# Patient Record
Sex: MALE | Race: WHITE | NOT HISPANIC OR LATINO | Employment: FULL TIME | URBAN - METROPOLITAN AREA
[De-identification: names, ages, dates, MRNs, and addresses within clinical notes are randomized per-mention and may not be internally consistent; named-entity substitution may affect disease eponyms.]

---

## 2017-06-08 DIAGNOSIS — I71.21 ASCENDING AORTIC ANEURYSM (HCC): Primary | ICD-10-CM

## 2017-06-08 DIAGNOSIS — Z95.2 H/O PROSTHETIC AORTIC VALVE REPLACEMENT: ICD-10-CM

## 2017-06-26 ENCOUNTER — HOSPITAL ENCOUNTER (OUTPATIENT)
Dept: CARDIOLOGY | Facility: HOSPITAL | Age: 42
Discharge: HOME | End: 2017-06-26
Attending: THORACIC SURGERY (CARDIOTHORACIC VASCULAR SURGERY)

## 2017-06-26 ENCOUNTER — HOSPITAL ENCOUNTER (OUTPATIENT)
Dept: CT IMAGING | Facility: HOSPITAL | Age: 42
Discharge: HOME | End: 2017-06-26
Attending: THORACIC SURGERY (CARDIOTHORACIC VASCULAR SURGERY) | Admitting: THORACIC SURGERY (CARDIOTHORACIC VASCULAR SURGERY)

## 2017-06-26 VITALS
SYSTOLIC BLOOD PRESSURE: 134 MMHG | HEIGHT: 75 IN | HEART RATE: 55 BPM | BODY MASS INDEX: 31.58 KG/M2 | DIASTOLIC BLOOD PRESSURE: 92 MMHG | OXYGEN SATURATION: 100 % | WEIGHT: 254 LBS

## 2017-06-26 DIAGNOSIS — Z95.2 H/O PROSTHETIC AORTIC VALVE REPLACEMENT: ICD-10-CM

## 2017-06-26 DIAGNOSIS — I71.21 ASCENDING AORTIC ANEURYSM (HCC): ICD-10-CM

## 2017-06-26 LAB
ASCENDING AORTA: 3.1 CM
BH CV ECHO MEAS - ACS: 2 CM
BH CV ECHO MEAS - AO MAX PG: 10 MMHG
BH CV ECHO MEAS - AO MEAN PG (FULL): 5 MMHG
BH CV ECHO MEAS - AO MEAN PG: 6 MMHG
BH CV ECHO MEAS - AO ROOT AREA (BSA CORRECTED): 1.6
BH CV ECHO MEAS - AO ROOT AREA: 11.3 CM^2
BH CV ECHO MEAS - AO ROOT DIAM: 3.8 CM
BH CV ECHO MEAS - AO V2 MAX: 159 CM/SEC
BH CV ECHO MEAS - AO V2 MEAN: 109 CM/SEC
BH CV ECHO MEAS - AO V2 VTI: 35.7 CM
BH CV ECHO MEAS - ASC AORTA: 3.1 CM
BH CV ECHO MEAS - AVA(I,A): 1.2 CM^2
BH CV ECHO MEAS - AVA(I,D): 1.2 CM^2
BH CV ECHO MEAS - BSA(HAYCOCK): 2.5 M^2
BH CV ECHO MEAS - BSA: 2.4 M^2
BH CV ECHO MEAS - BZI_BMI: 31.7 KILOGRAMS/M^2
BH CV ECHO MEAS - BZI_METRIC_HEIGHT: 190.5 CM
BH CV ECHO MEAS - BZI_METRIC_WEIGHT: 115.2 KG
BH CV ECHO MEAS - CONTRAST EF (2CH): 52.3 ML/M^2
BH CV ECHO MEAS - CONTRAST EF 4CH: 49.7 ML/M^2
BH CV ECHO MEAS - EDV(CUBED): 117.6 ML
BH CV ECHO MEAS - EDV(MOD-SP2): 176 ML
BH CV ECHO MEAS - EDV(MOD-SP4): 187 ML
BH CV ECHO MEAS - EDV(TEICH): 112.8 ML
BH CV ECHO MEAS - EF(CUBED): 79.3 %
BH CV ECHO MEAS - EF(MOD-SP2): 52.3 %
BH CV ECHO MEAS - EF(MOD-SP4): 49.7 %
BH CV ECHO MEAS - EF(TEICH): 71.4 %
BH CV ECHO MEAS - ESV(CUBED): 24.4 ML
BH CV ECHO MEAS - ESV(MOD-SP2): 84 ML
BH CV ECHO MEAS - ESV(MOD-SP4): 94 ML
BH CV ECHO MEAS - ESV(TEICH): 32.2 ML
BH CV ECHO MEAS - FS: 40.8 %
BH CV ECHO MEAS - IVS/LVPW: 0.82
BH CV ECHO MEAS - IVSD: 0.9 CM
BH CV ECHO MEAS - LA DIMENSION(2D): 21 CM
BH CV ECHO MEAS - LA DIMENSION: 6 CM
BH CV ECHO MEAS - LAT PEAK E' VEL: 8 CM/SEC
BH CV ECHO MEAS - LV DIASTOLIC VOL/BSA (35-75): 77 ML/M^2
BH CV ECHO MEAS - LV MASS(C)D: 176 GRAMS
BH CV ECHO MEAS - LV MASS(C)DI: 72.5 GRAMS/M^2
BH CV ECHO MEAS - LV MEAN PG: 1 MMHG
BH CV ECHO MEAS - LV SYSTOLIC VOL/BSA (12-30): 38.7 ML/M^2
BH CV ECHO MEAS - LV V1 MEAN: 34.5 CM/SEC
BH CV ECHO MEAS - LV V1 VTI: 13.8 CM
BH CV ECHO MEAS - LVIDD: 4.9 CM
BH CV ECHO MEAS - LVIDS: 2.9 CM
BH CV ECHO MEAS - LVLD AP2: 9.6 CM
BH CV ECHO MEAS - LVLD AP4: 9.9 CM
BH CV ECHO MEAS - LVLS AP2: 8.5 CM
BH CV ECHO MEAS - LVLS AP4: 8.8 CM
BH CV ECHO MEAS - LVOT AREA (M): 3.1 CM^2
BH CV ECHO MEAS - LVOT AREA: 3.1 CM^2
BH CV ECHO MEAS - LVOT DIAM: 2 CM
BH CV ECHO MEAS - LVPWD: 1.1 CM
BH CV ECHO MEAS - MED PEAK E' VEL: 8 CM/SEC
BH CV ECHO MEAS - MR MAX PG: 31.8 MMHG
BH CV ECHO MEAS - MR MAX VEL: 282 CM/SEC
BH CV ECHO MEAS - MV A DUR: 0.12 SEC
BH CV ECHO MEAS - MV A MAX VEL: 45.5 CM/SEC
BH CV ECHO MEAS - MV DEC SLOPE: 329 CM/SEC^2
BH CV ECHO MEAS - MV DEC TIME: 0.14 SEC
BH CV ECHO MEAS - MV E MAX VEL: 60.3 CM/SEC
BH CV ECHO MEAS - MV E/A: 1.3
BH CV ECHO MEAS - MV MEAN PG: 1 MMHG
BH CV ECHO MEAS - MV P1/2T MAX VEL: 63.6 CM/SEC
BH CV ECHO MEAS - MV P1/2T: 56.6 MSEC
BH CV ECHO MEAS - MV V2 MEAN: 37.3 CM/SEC
BH CV ECHO MEAS - MV V2 VTI: 25.6 CM
BH CV ECHO MEAS - MVA P1/2T LCG: 3.5 CM^2
BH CV ECHO MEAS - MVA(P1/2T): 3.9 CM^2
BH CV ECHO MEAS - MVA(VTI): 1.7 CM^2
BH CV ECHO MEAS - PA ACC SLOPE: 1487 CM/SEC^2
BH CV ECHO MEAS - PA ACC TIME: 0.07 SEC
BH CV ECHO MEAS - PA MAX PG (FULL): 2.3 MMHG
BH CV ECHO MEAS - PA MAX PG: 4.3 MMHG
BH CV ECHO MEAS - PA PR(ACCEL): 47.5 MMHG
BH CV ECHO MEAS - PA V2 MAX: 104 CM/SEC
BH CV ECHO MEAS - PI END-D VEL: 91.8 CM/SEC
BH CV ECHO MEAS - PULM A REVS DUR: 0.1 SEC
BH CV ECHO MEAS - PULM A REVS VEL: 30.3 CM/SEC
BH CV ECHO MEAS - PULM DIAS VEL: 50.2 CM/SEC
BH CV ECHO MEAS - PULM S/D: 1
BH CV ECHO MEAS - PULM SYS VEL: 50.7 CM/SEC
BH CV ECHO MEAS - PVA(V,A): 2.1 CM^2
BH CV ECHO MEAS - PVA(V,D): 2.1 CM^2
BH CV ECHO MEAS - QP/QS: 1.2
BH CV ECHO MEAS - RAP SYSTOLE: 3 MMHG
BH CV ECHO MEAS - RV MAX PG: 2 MMHG
BH CV ECHO MEAS - RV MEAN PG: 1 MMHG
BH CV ECHO MEAS - RV V1 MAX: 71 CM/SEC
BH CV ECHO MEAS - RV V1 MEAN: 44.2 CM/SEC
BH CV ECHO MEAS - RV V1 VTI: 16.2 CM
BH CV ECHO MEAS - RVOT AREA: 3.1 CM^2
BH CV ECHO MEAS - RVOT DIAM: 2 CM
BH CV ECHO MEAS - RVSP: 18.7 MMHG
BH CV ECHO MEAS - SI(AO): 166.7 ML/M^2
BH CV ECHO MEAS - SI(CUBED): 38.4 ML/M^2
BH CV ECHO MEAS - SI(LVOT): 17.8 ML/M^2
BH CV ECHO MEAS - SI(MOD-SP2): 37.9 ML/M^2
BH CV ECHO MEAS - SI(MOD-SP4): 38.3 ML/M^2
BH CV ECHO MEAS - SI(TEICH): 33.2 ML/M^2
BH CV ECHO MEAS - SV(AO): 404.9 ML
BH CV ECHO MEAS - SV(CUBED): 93.3 ML
BH CV ECHO MEAS - SV(LVOT): 43.4 ML
BH CV ECHO MEAS - SV(MOD-SP2): 92 ML
BH CV ECHO MEAS - SV(MOD-SP4): 93 ML
BH CV ECHO MEAS - SV(RVOT): 50.9 ML
BH CV ECHO MEAS - SV(TEICH): 80.6 ML
BH CV ECHO MEAS - TAPSE (>1.6): 1.7 CM2
BH CV ECHO MEAS - TR MAX VEL: 198 CM/SEC
BH CV VAS BP RIGHT ARM: NORMAL MMHG
BH CV XLRA - RV BASE: 3.8 CM
BH CV XLRA - TDI S': 13 CM/SEC
E/E' RATIO: 8
LEFT ATRIUM VOLUME INDEX: 29 ML/M2
LEFT ATRIUM VOLUME: 56 CM3

## 2017-06-26 PROCEDURE — 93306 TTE W/DOPPLER COMPLETE: CPT

## 2017-06-26 PROCEDURE — 93306 TTE W/DOPPLER COMPLETE: CPT | Performed by: INTERNAL MEDICINE

## 2017-06-26 PROCEDURE — 71250 CT THORAX DX C-: CPT

## 2017-07-18 ENCOUNTER — OFFICE VISIT (OUTPATIENT)
Dept: CARDIAC SURGERY | Facility: CLINIC | Age: 42
End: 2017-07-18

## 2017-07-18 VITALS
TEMPERATURE: 98.3 F | RESPIRATION RATE: 20 BRPM | OXYGEN SATURATION: 96 % | SYSTOLIC BLOOD PRESSURE: 156 MMHG | WEIGHT: 250 LBS | BODY MASS INDEX: 29.52 KG/M2 | HEIGHT: 77 IN | DIASTOLIC BLOOD PRESSURE: 88 MMHG | HEART RATE: 65 BPM

## 2017-07-18 DIAGNOSIS — Z95.2 S/P AVR (AORTIC VALVE REPLACEMENT): Primary | ICD-10-CM

## 2017-07-18 DIAGNOSIS — Z86.79 STATUS POST ASCENDING AORTIC ANEURYSM REPAIR: ICD-10-CM

## 2017-07-18 DIAGNOSIS — F41.8 ANXIETY ABOUT HEALTH: ICD-10-CM

## 2017-07-18 DIAGNOSIS — Z98.890 STATUS POST ASCENDING AORTIC ANEURYSM REPAIR: ICD-10-CM

## 2017-07-18 PROCEDURE — 99214 OFFICE O/P EST MOD 30 MIN: CPT | Performed by: THORACIC SURGERY (CARDIOTHORACIC VASCULAR SURGERY)

## 2017-07-18 RX ORDER — MELATONIN
1000 DAILY
COMMUNITY

## 2017-07-22 PROBLEM — Z98.890 STATUS POST ASCENDING AORTIC ANEURYSM REPAIR: Status: ACTIVE | Noted: 2017-07-22

## 2017-07-22 PROBLEM — Z86.79 STATUS POST ASCENDING AORTIC ANEURYSM REPAIR: Status: ACTIVE | Noted: 2017-07-22

## 2017-07-22 PROBLEM — F41.8 ANXIETY ABOUT HEALTH: Status: ACTIVE | Noted: 2017-07-22

## 2017-07-22 PROBLEM — Z95.2 S/P AVR (AORTIC VALVE REPLACEMENT): Status: ACTIVE | Noted: 2017-07-22

## 2017-07-22 NOTE — PROGRESS NOTES
7/18/17    Chief Complaint:     Follow up evaluation of thoracic aortic aneurysm    History of Present Illness:       Dear Dr. Norton and Colleagues,  It was nice to see Maged Christopher in follow up evaluation for his thoracic aorta. He is a 41 y.o. male with a history of a root aneurysm and severe AI and a bicuspid aortic valve. He underwent surgery with a root repair, AVR and ascending aortic aneurysm graft repair. He did well and continues to do well . He denies chest or upper back pain, dyspnea or orthopnea or PND. His last chest CT showed the residual aorta without aneurysmal involvement. The echocardiogram showed the biologic aortic valve without leak or failure. He is extremely anxious and demands frequent evaluations. He is very concerned about growth of the aorta or failure of the valve. Both are fine.    Patient Active Problem List   Diagnosis   • Status post ascending aortic aneurysm repair   • S/P AVR (aortic valve replacement)   • Anxiety about health       Past Medical History:   Diagnosis Date   • Aortic valve insufficiency    • Lung collapse    • Palpitations    • Valvular heart disease        Past Surgical History:   Procedure Laterality Date   • AORTIC VALVE REPAIR/REPLACEMENT     • OTHER SURGICAL HISTORY      ASC AORTIC ANEURYSM GRAFT, CORONARY RECONST, ROOT REPLACE   • THORACOSCOPY      (Therapeutic) with pleurodesis       No Known Allergies      Current Outpatient Prescriptions:   •  cetirizine (ZyrTEC) 10 MG tablet, Take 10 mg by mouth daily., Disp: , Rfl:   •  cholecalciferol (VITAMIN D3) 1000 UNITS tablet, Take 1,000 Units by mouth Daily., Disp: , Rfl:   •  levothyroxine (SYNTHROID, LEVOTHROID) 75 MCG tablet, Take 75 mcg by mouth daily., Disp: , Rfl:   •  metoprolol tartrate (LOPRESSOR) 25 MG tablet, TAKE ONE-HALF TABLET BY MOUTH TWICE DAILY, Disp: 90 tablet, Rfl: 3  •  montelukast (SINGULAIR) 10 MG tablet, Take 1 tablet by mouth daily., Disp: , Rfl:   •  Multiple Vitamin (MULTIVITAMIN)  capsule, Take 1 capsule by mouth daily., Disp: , Rfl:     Social History     Social History   • Marital status:      Spouse name: N/A   • Number of children: N/A   • Years of education: N/A     Occupational History   • Not on file.     Social History Main Topics   • Smoking status: Never Smoker   • Smokeless tobacco: Never Used   • Alcohol use Yes      Comment: once every 4 months   • Drug use: No   • Sexual activity: Not on file     Other Topics Concern   • Not on file     Social History Narrative       Family History   Problem Relation Age of Onset   • Hypertension Paternal Grandfather    • Valvular heart disease Other      Review of Systems   Constitutional: Negative for fatigue.   Respiratory: Negative for chest tightness and shortness of breath.    Cardiovascular: Negative for chest pain and leg swelling.   Psychiatric/Behavioral:        Severe anxiety   All other systems reviewed and are negative.      Physical Exam:    Vital Signs:  Weight: 250 lb (113 kg)   Body mass index is 29.65 kg/(m^2).  Temp: 98.3 °F (36.8 °C)   Heart Rate: 65   BP: 156/88     Physical Exam   Constitutional: He is oriented to person, place, and time. He appears well-developed and well-nourished.   HENT:   Head: Normocephalic and atraumatic.   Nose: Nose normal.   Mouth/Throat: Oropharynx is clear and moist.   Eyes: Conjunctivae are normal. Pupils are equal, round, and reactive to light.   Neck: Normal range of motion. Neck supple. No JVD present. No thyromegaly present.   Cardiovascular: Normal rate, regular rhythm, normal heart sounds and intact distal pulses.  Exam reveals no gallop and no friction rub.    No murmur heard.  Pulmonary/Chest: Effort normal and breath sounds normal. He has no rales.   Abdominal: Soft. Bowel sounds are normal. He exhibits no distension and no mass. There is no tenderness.   Musculoskeletal: Normal range of motion. He exhibits no tenderness or deformity.   Incision is well healed and sternum stable    Lymphadenopathy:     He has no cervical adenopathy.   Neurological: He is alert and oriented to person, place, and time. He has normal reflexes.   Skin: Skin is warm and dry. No rash noted. No erythema.   Psychiatric: He has a normal mood and affect. His behavior is normal.        Assessment:     Problem List Items Addressed This Visit        Cardiovascular and Mediastinum    S/P AVR (aortic valve replacement) - Primary       Other    Status post ascending aortic aneurysm repair    Anxiety about health        Stable residual aorta  Bicuspid aortopathy  HTN with elevation of diastolic component, anxiety related    Recommendation/Plan:     Chest CT and office visit in one year. I believe he will benefit from anxiety control with medications or meditation    Thank you for allowing me to participate in his care.    Regards,    Lupillo Black M.D.

## 2017-08-18 ENCOUNTER — OFFICE VISIT (OUTPATIENT)
Dept: CARDIOLOGY | Facility: CLINIC | Age: 42
End: 2017-08-18

## 2017-08-18 VITALS
SYSTOLIC BLOOD PRESSURE: 160 MMHG | WEIGHT: 253 LBS | HEART RATE: 64 BPM | DIASTOLIC BLOOD PRESSURE: 90 MMHG | HEIGHT: 77 IN | BODY MASS INDEX: 29.87 KG/M2

## 2017-08-18 DIAGNOSIS — I10 ESSENTIAL HYPERTENSION: Primary | ICD-10-CM

## 2017-08-18 PROCEDURE — 93000 ELECTROCARDIOGRAM COMPLETE: CPT | Performed by: INTERNAL MEDICINE

## 2017-08-18 PROCEDURE — 99213 OFFICE O/P EST LOW 20 MIN: CPT | Performed by: INTERNAL MEDICINE

## 2017-08-18 NOTE — PROGRESS NOTES
Subjective:     Encounter Date:08/18/2017      Patient ID: Maged Christopher is a 42 y.o. male.    Chief Complaint:  Hypertension   This is a chronic problem. The current episode started more than 1 year ago. The problem has been waxing and waning since onset. Associated symptoms include malaise/fatigue.   Fatigue   This is a recurrent problem. The current episode started more than 1 month ago. The problem occurs constantly. The problem has been waxing and waning. Associated symptoms include fatigue.       42-year-old gentleman who presents today for reevaluation.  He's been having an enormous amount of fatigue.  Has a history of hypertension.  I was concerned that his beta blocker was contributing to some of his fatigue.  I cut his beta blocker in half and unfortunately his fatigue really hasn't changed much.  He has been eating better he's lost 15 pounds.  His blood pressure little elevated at 160/90 he is set up for a sleep study on Tuesday.  He presents today for reevaluation        Review of Systems   Constitution: Positive for fatigue and malaise/fatigue.   Eyes: Positive for double vision.   All other systems reviewed and are negative.        ECG 12 Lead  Date/Time: 8/18/2017 3:49 PM  Performed by: AJIT FRANCIS  Authorized by: AJIT FRANCIS   Previous ECG: no previous ECG available  Rhythm: sinus rhythm  Other findings: PRWP  Clinical impression: abnormal ECG               Objective:     Physical Exam   Constitutional: He is oriented to person, place, and time. He appears well-developed.   HENT:   Head: Normocephalic.   Eyes: Conjunctivae are normal.   Neck: Normal range of motion.   Cardiovascular: Normal rate, regular rhythm and normal heart sounds.    Pulmonary/Chest: Breath sounds normal.   Abdominal: Soft. Bowel sounds are normal.   Musculoskeletal: Normal range of motion. He exhibits no edema.   Neurological: He is alert and oriented to person, place, and time.   Skin: Skin is warm and dry.    Psychiatric: He has a normal mood and affect. His behavior is normal.   Vitals reviewed.      Lab Review:       Assessment:         No diagnosis found.       Plan:       1.  Hypertension.  Blood pressures elevated I told him to go back up on his beta blocker especially since his symptoms did not change with decreasing his dose.  2.  Fatigue he had thyroid issues which probably was contributory he also has sleep apnea type symptoms and is set up for a sleep study.  I agree with this at May really help his blood pressure if he has significant sleep apnea.  3.  Patient told to follow-up in 3 to

## 2017-12-06 ENCOUNTER — OFFICE VISIT (OUTPATIENT)
Dept: CARDIOLOGY | Facility: CLINIC | Age: 42
End: 2017-12-06

## 2017-12-06 VITALS
SYSTOLIC BLOOD PRESSURE: 136 MMHG | DIASTOLIC BLOOD PRESSURE: 88 MMHG | HEIGHT: 77 IN | WEIGHT: 254 LBS | BODY MASS INDEX: 29.99 KG/M2 | HEART RATE: 60 BPM

## 2017-12-06 DIAGNOSIS — Z86.79 STATUS POST ASCENDING AORTIC ANEURYSM REPAIR: ICD-10-CM

## 2017-12-06 DIAGNOSIS — Z95.2 S/P AVR (AORTIC VALVE REPLACEMENT): Primary | ICD-10-CM

## 2017-12-06 DIAGNOSIS — Z98.890 STATUS POST ASCENDING AORTIC ANEURYSM REPAIR: ICD-10-CM

## 2017-12-06 DIAGNOSIS — I10 ESSENTIAL HYPERTENSION: ICD-10-CM

## 2017-12-06 PROCEDURE — 93000 ELECTROCARDIOGRAM COMPLETE: CPT | Performed by: INTERNAL MEDICINE

## 2017-12-06 PROCEDURE — 99213 OFFICE O/P EST LOW 20 MIN: CPT | Performed by: INTERNAL MEDICINE

## 2017-12-06 NOTE — PROGRESS NOTES
Subjective:     Encounter Date:12/06/2017      Patient ID: Maged Christopher is a 42 y.o. male.    Chief Complaint:  Hypertension   This is a chronic problem. The current episode started more than 1 year ago. The problem is controlled. Pertinent negatives include no anxiety, chest pain, malaise/fatigue, peripheral edema or shortness of breath.       42-year-old gentleman with a history of hypertension as well as aortic valve replacement presents today for reevaluation.  He had sinus surgery last week removed polyps as well as a deviated septum.  He is doing significantly better from that aspect.  His blood pressure today was good significantly better than before.    Review of Systems   Constitution: Negative for malaise/fatigue.   Cardiovascular: Negative for chest pain.   Respiratory: Negative for shortness of breath.    All other systems reviewed and are negative.        ECG 12 Lead  Date/Time: 12/6/2017 10:20 AM  Performed by: AJIT FRANCIS  Authorized by: JAIT FRANCIS   Comparison: compared with previous ECG from 8/18/2017  Similar to previous ECG  Rhythm: sinus rhythm  Clinical impression: normal ECG               Objective:     Physical Exam   Constitutional: He is oriented to person, place, and time. He appears well-developed.   HENT:   Head: Normocephalic.   Eyes: Conjunctivae are normal.   Neck: Normal range of motion.   Cardiovascular: Normal rate, regular rhythm and normal heart sounds.    Pulmonary/Chest: Breath sounds normal.   Abdominal: Soft. Bowel sounds are normal.   Musculoskeletal: Normal range of motion. He exhibits no edema.   Neurological: He is alert and oriented to person, place, and time.   Skin: Skin is warm and dry.   Psychiatric: He has a normal mood and affect. His behavior is normal.   Vitals reviewed.      Lab Review:       Assessment:          Diagnosis Plan   1. S/P AVR (aortic valve replacement)     2. Status post ascending aortic aneurysm repair            Plan:        1.  Hypertension blood pressure is better continue same.  2.  Status post aVR and a descending aortic aneurysm repair.  Echo earlier this year on 6/8/17 showed valve was functioning well with no issues.  Ejection fraction was 50%.  3.  Status post sinus surgery doing well  4.  Follow-up one year

## 2018-01-02 ENCOUNTER — OFFICE VISIT (OUTPATIENT)
Dept: FAMILY MEDICINE CLINIC | Facility: CLINIC | Age: 43
End: 2018-01-02

## 2018-01-02 VITALS
SYSTOLIC BLOOD PRESSURE: 120 MMHG | HEIGHT: 77 IN | BODY MASS INDEX: 30.7 KG/M2 | WEIGHT: 260 LBS | OXYGEN SATURATION: 99 % | HEART RATE: 58 BPM | TEMPERATURE: 98.6 F | DIASTOLIC BLOOD PRESSURE: 78 MMHG

## 2018-01-02 DIAGNOSIS — E03.9 ACQUIRED HYPOTHYROIDISM: ICD-10-CM

## 2018-01-02 DIAGNOSIS — Z95.2 S/P AVR (AORTIC VALVE REPLACEMENT): Primary | ICD-10-CM

## 2018-01-02 DIAGNOSIS — Z86.79 STATUS POST ASCENDING AORTIC ANEURYSM REPAIR: ICD-10-CM

## 2018-01-02 DIAGNOSIS — Z98.890 STATUS POST ASCENDING AORTIC ANEURYSM REPAIR: ICD-10-CM

## 2018-01-02 PROBLEM — J30.89 ENVIRONMENTAL AND SEASONAL ALLERGIES: Status: ACTIVE | Noted: 2018-01-02

## 2018-01-02 PROCEDURE — 99203 OFFICE O/P NEW LOW 30 MIN: CPT | Performed by: INTERNAL MEDICINE

## 2018-01-02 RX ORDER — LEVOTHYROXINE SODIUM 88 UG/1
88 TABLET ORAL DAILY
Start: 2018-01-02 | End: 2018-02-14 | Stop reason: SDUPTHER

## 2018-01-02 NOTE — PROGRESS NOTES
Subjective   Maged Christopher is a 42 y.o. male. Patient is here today for establishing care as a new patient with me.  He has a history of aortic valve replacement and repair of an ascending aortic aneurysm about 2014 and sees Dr. Saha regularly.  He's been doing well.  He is feeling fine and does have a history of hypothyroidism and environmental allergies.  He also had spontaneous pneumothorax twice in about 15 years ago and had a procedure to prevent it and is had no further problems.  He generally feels well.  Chief Complaint   Patient presents with   • Hyperthyroidism     needs medication refills           Vitals:    01/02/18 0948   BP: 120/78   Pulse: 58   Temp: 98.6 °F (37 °C)   SpO2: 99%     The following portions of the patient's history were reviewed and updated as appropriate: allergies, current medications, past family history, past medical history, past social history, past surgical history and problem list.    Past Medical History:   Diagnosis Date   • Aneurysm of ascending aorta    • Aortic regurgitation    • Aortic valve insufficiency    • Bicuspid aortic valve    • Lung collapse    • Palpitations    • Valvular heart disease       No Known Allergies   Social History     Social History   • Marital status:      Spouse name: N/A   • Number of children: N/A   • Years of education: N/A     Occupational History   • Not on file.     Social History Main Topics   • Smoking status: Former Smoker   • Smokeless tobacco: Never Used   • Alcohol use Yes      Comment: once every 4 months/caffeine use 2 cups day   • Drug use: No   • Sexual activity: Not on file     Other Topics Concern   • Not on file     Social History Narrative        Current Outpatient Prescriptions:   •  cetirizine (ZyrTEC) 10 MG tablet, Take 10 mg by mouth daily., Disp: , Rfl:   •  cholecalciferol (VITAMIN D3) 1000 UNITS tablet, Take 1,000 Units by mouth Daily., Disp: , Rfl:   •  levothyroxine (SYNTHROID, LEVOTHROID) 88 MCG tablet,  Take 1 tablet by mouth Daily., Disp: , Rfl:   •  metoprolol tartrate (LOPRESSOR) 25 MG tablet, Take 1 tablet by mouth 2 (Two) Times a Day., Disp: 180 tablet, Rfl: 3  •  montelukast (SINGULAIR) 10 MG tablet, Take 1 tablet by mouth daily., Disp: , Rfl:   •  Multiple Vitamin (MULTIVITAMIN) capsule, Take 1 capsule by mouth daily., Disp: , Rfl:      Objective     History of Present Illness     Review of Systems   Constitutional: Negative.    HENT: Negative.    Eyes: Negative.    Respiratory: Negative.    Cardiovascular: Negative.    Gastrointestinal: Negative.    Genitourinary: Negative.    Musculoskeletal: Negative.    Skin: Negative.    Neurological: Negative.    Psychiatric/Behavioral: Negative.        Physical Exam   Constitutional: He is oriented to person, place, and time. He appears well-developed and well-nourished.   Pleasant, cooperative and in no distress with a blood pressure 120/82   HENT:   Head: Normocephalic and atraumatic.   Eyes: Conjunctivae are normal. Pupils are equal, round, and reactive to light. No scleral icterus.   Neck: Normal range of motion. Neck supple. No thyromegaly present.   Cardiovascular: Normal rate and regular rhythm.    Murmur heard.  A 2/6 systolic murmur at the upper right sternal border especially   Pulmonary/Chest: Effort normal and breath sounds normal. No respiratory distress. He has no wheezes. He has no rales.   Musculoskeletal: Normal range of motion. He exhibits no edema.   Neurological: He is alert and oriented to person, place, and time.   Skin: Skin is warm and dry.   Psychiatric: He has a normal mood and affect. His behavior is normal.   Nursing note and vitals reviewed.      ASSESSMENT  a new patient to me with a history of aortic valve replacement and aortic aneurysm repair seems to be doing well.  He also has hypothyroidism and should have laboratory studies checked.  He is asymptomatic today.     Problem List Items Addressed This Visit        Cardiovascular and  Mediastinum    S/P AVR (aortic valve replacement) - Primary       Endocrine    Acquired hypothyroidism    Relevant Medications    levothyroxine (SYNTHROID, LEVOTHROID) 88 MCG tablet       Other    Status post ascending aortic aneurysm repair          PLAN  The patient will continue his current medicines as now.  I will schedule him for follow-up in 1-2 months with a CBC, CMP, lipid panel, TSH and free T4    There are no Patient Instructions on file for this visit.  Return in about 6 weeks (around 2/13/2018) for with labs.

## 2018-02-01 DIAGNOSIS — E03.9 ACQUIRED HYPOTHYROIDISM: Primary | ICD-10-CM

## 2018-02-07 LAB
ALBUMIN SERPL-MCNC: 4.6 G/DL (ref 3.5–5.2)
ALBUMIN/GLOB SERPL: 1.6 G/DL
ALP SERPL-CCNC: 64 U/L (ref 39–117)
ALT SERPL-CCNC: 30 U/L (ref 1–41)
AST SERPL-CCNC: 29 U/L (ref 1–40)
BASOPHILS # BLD AUTO: 0.07 10*3/MM3 (ref 0–0.2)
BASOPHILS NFR BLD AUTO: 1.2 % (ref 0–1.5)
BILIRUB SERPL-MCNC: 0.7 MG/DL (ref 0.1–1.2)
BUN SERPL-MCNC: 16 MG/DL (ref 6–20)
BUN/CREAT SERPL: 16.8 (ref 7–25)
CALCIUM SERPL-MCNC: 9.5 MG/DL (ref 8.6–10.5)
CHLORIDE SERPL-SCNC: 100 MMOL/L (ref 98–107)
CHOLEST SERPL-MCNC: 192 MG/DL (ref 0–200)
CO2 SERPL-SCNC: 28.7 MMOL/L (ref 22–29)
CREAT SERPL-MCNC: 0.95 MG/DL (ref 0.76–1.27)
EOSINOPHIL # BLD AUTO: 0.13 10*3/MM3 (ref 0–0.7)
EOSINOPHIL NFR BLD AUTO: 2.2 % (ref 0.3–6.2)
ERYTHROCYTE [DISTWIDTH] IN BLOOD BY AUTOMATED COUNT: 12.9 % (ref 11.5–14.5)
GFR SERPLBLD CREATININE-BSD FMLA CKD-EPI: 105 ML/MIN/1.73
GFR SERPLBLD CREATININE-BSD FMLA CKD-EPI: 87 ML/MIN/1.73
GLOBULIN SER CALC-MCNC: 2.9 GM/DL
GLUCOSE SERPL-MCNC: 86 MG/DL (ref 65–99)
HCT VFR BLD AUTO: 46 % (ref 40.4–52.2)
HDLC SERPL-MCNC: 28 MG/DL (ref 40–60)
HGB BLD-MCNC: 15.5 G/DL (ref 13.7–17.6)
IMM GRANULOCYTES # BLD: 0.02 10*3/MM3 (ref 0–0.03)
IMM GRANULOCYTES NFR BLD: 0.3 % (ref 0–0.5)
LDLC SERPL CALC-MCNC: 127 MG/DL (ref 0–100)
LDLC/HDLC SERPL: 4.54 {RATIO}
LYMPHOCYTES # BLD AUTO: 1.97 10*3/MM3 (ref 0.9–4.8)
LYMPHOCYTES NFR BLD AUTO: 34.1 % (ref 19.6–45.3)
MCH RBC QN AUTO: 30.9 PG (ref 27–32.7)
MCHC RBC AUTO-ENTMCNC: 33.7 G/DL (ref 32.6–36.4)
MCV RBC AUTO: 91.6 FL (ref 79.8–96.2)
MONOCYTES # BLD AUTO: 0.56 10*3/MM3 (ref 0.2–1.2)
MONOCYTES NFR BLD AUTO: 9.7 % (ref 5–12)
NEUTROPHILS # BLD AUTO: 3.03 10*3/MM3 (ref 1.9–8.1)
NEUTROPHILS NFR BLD AUTO: 52.5 % (ref 42.7–76)
PLATELET # BLD AUTO: 233 10*3/MM3 (ref 140–500)
POTASSIUM SERPL-SCNC: 4.5 MMOL/L (ref 3.5–5.2)
PROT SERPL-MCNC: 7.5 G/DL (ref 6–8.5)
RBC # BLD AUTO: 5.02 10*6/MM3 (ref 4.6–6)
SODIUM SERPL-SCNC: 139 MMOL/L (ref 136–145)
T4 FREE SERPL-MCNC: 1.47 NG/DL (ref 0.93–1.7)
TRIGL SERPL-MCNC: 185 MG/DL (ref 0–150)
TSH SERPL DL<=0.005 MIU/L-ACNC: 3.08 MIU/ML (ref 0.27–4.2)
VLDLC SERPL CALC-MCNC: 37 MG/DL (ref 5–40)
WBC # BLD AUTO: 5.78 10*3/MM3 (ref 4.5–10.7)

## 2018-02-14 ENCOUNTER — OFFICE VISIT (OUTPATIENT)
Dept: FAMILY MEDICINE CLINIC | Facility: CLINIC | Age: 43
End: 2018-02-14

## 2018-02-14 VITALS
HEART RATE: 54 BPM | SYSTOLIC BLOOD PRESSURE: 120 MMHG | DIASTOLIC BLOOD PRESSURE: 90 MMHG | BODY MASS INDEX: 30.65 KG/M2 | HEIGHT: 77 IN | OXYGEN SATURATION: 98 % | TEMPERATURE: 98.6 F | WEIGHT: 259.6 LBS

## 2018-02-14 DIAGNOSIS — E78.49 OTHER HYPERLIPIDEMIA: ICD-10-CM

## 2018-02-14 DIAGNOSIS — J30.89 ENVIRONMENTAL AND SEASONAL ALLERGIES: ICD-10-CM

## 2018-02-14 DIAGNOSIS — Z86.79 STATUS POST ASCENDING AORTIC ANEURYSM REPAIR: ICD-10-CM

## 2018-02-14 DIAGNOSIS — Z95.2 S/P AVR (AORTIC VALVE REPLACEMENT): Primary | ICD-10-CM

## 2018-02-14 DIAGNOSIS — Z98.890 STATUS POST ASCENDING AORTIC ANEURYSM REPAIR: ICD-10-CM

## 2018-02-14 DIAGNOSIS — E03.9 ACQUIRED HYPOTHYROIDISM: ICD-10-CM

## 2018-02-14 PROCEDURE — 99213 OFFICE O/P EST LOW 20 MIN: CPT | Performed by: INTERNAL MEDICINE

## 2018-02-14 RX ORDER — LEVOTHYROXINE SODIUM 88 UG/1
88 TABLET ORAL DAILY
Qty: 90 TABLET | Refills: 3
Start: 2018-02-14 | End: 2018-03-21 | Stop reason: SDUPTHER

## 2018-02-14 NOTE — PROGRESS NOTES
Subjective   Maged Christopher is a 42 y.o. male. Patient is here today for follow-up on his hypothyroidism, hyperlipidemia and history of aortic valve replacement and ascending aortic aneurysm repair.  He is generally feeling well and is had no chest pain, shortness of breath, edema or myalgias.  He did see cardiologist in December and was doing well.  He has no new acute problems.   Chief Complaint   Patient presents with   • Hypothyroidism     FOLLOW UP LABS          Vitals:    02/14/18 1253   BP: 120/90   Pulse: 54   Temp: 98.6 °F (37 °C)   SpO2: 98%     The following portions of the patient's history were reviewed and updated as appropriate: allergies, current medications, past family history, past medical history, past social history, past surgical history and problem list.    Past Medical History:   Diagnosis Date   • Aneurysm of ascending aorta    • Aortic regurgitation    • Aortic valve insufficiency    • Bicuspid aortic valve    • Lung collapse    • Palpitations    • Valvular heart disease       No Known Allergies   Social History     Social History   • Marital status:      Spouse name: N/A   • Number of children: N/A   • Years of education: N/A     Occupational History   • Not on file.     Social History Main Topics   • Smoking status: Former Smoker   • Smokeless tobacco: Never Used   • Alcohol use Yes      Comment: once every 4 months/caffeine use 2 cups day   • Drug use: No   • Sexual activity: Not on file     Other Topics Concern   • Not on file     Social History Narrative        Current Outpatient Prescriptions:   •  cetirizine (ZyrTEC) 10 MG tablet, Take 10 mg by mouth daily., Disp: , Rfl:   •  cholecalciferol (VITAMIN D3) 1000 UNITS tablet, Take 1,000 Units by mouth Daily., Disp: , Rfl:   •  levothyroxine (SYNTHROID, LEVOTHROID) 88 MCG tablet, Take 1 tablet by mouth Daily., Disp: 90 tablet, Rfl: 3  •  metoprolol tartrate (LOPRESSOR) 25 MG tablet, Take 1 tablet by mouth 2 (Two) Times a Day.,  Disp: 180 tablet, Rfl: 3  •  montelukast (SINGULAIR) 10 MG tablet, Take 1 tablet by mouth daily., Disp: , Rfl:   •  Multiple Vitamin (MULTIVITAMIN) capsule, Take 1 capsule by mouth daily., Disp: , Rfl:      Objective     History of Present Illness     Review of Systems   Constitutional: Negative.    HENT: Negative.    Eyes: Negative.    Respiratory: Negative.    Cardiovascular: Negative.    Gastrointestinal: Negative.    Endocrine: Negative.    Genitourinary: Negative.    Musculoskeletal: Negative.    Skin: Negative.    Neurological: Negative.    Psychiatric/Behavioral: Negative.        Physical Exam   Constitutional: He is oriented to person, place, and time. He appears well-developed and well-nourished.   Pleasant, cooperative no distress with a blood pressure of 118/80   HENT:   Head: Normocephalic and atraumatic.   Eyes: Conjunctivae are normal. Pupils are equal, round, and reactive to light. No scleral icterus.   Neck: Normal range of motion. Neck supple. No thyromegaly present.   Cardiovascular: Normal rate, regular rhythm and normal heart sounds.    Pulmonary/Chest: Effort normal and breath sounds normal. No respiratory distress. He has no wheezes. He has no rales.   Musculoskeletal: Normal range of motion. He exhibits no edema.   Neurological: He is alert and oriented to person, place, and time.   Skin: Skin is warm and dry.   Psychiatric: He has a normal mood and affect. His behavior is normal.   Nursing note and vitals reviewed.      ASSESSMENT  CBC was completely normal.  CMP was also completely normal as was TSH and free T4.  His lipid panels a bit high with a total cholesterol 192, HDL low at 28 but LDL slightly high 127 and triglycerides 185, slightly high.  #1-hyperlipidemia, we'll try diet control  #2-hypothyroidism, euthyroid on replacement  #3-history of aortic valve repair and ascending aortic aneurysm repair, stable and asymptomatic     Problem List Items Addressed This Visit         Cardiovascular and Mediastinum    S/P AVR (aortic valve replacement) - Primary    Other hyperlipidemia       Endocrine    Acquired hypothyroidism    Relevant Medications    levothyroxine (SYNTHROID, LEVOTHROID) 88 MCG tablet       Other    Status post ascending aortic aneurysm repair    Environmental and seasonal allergies          PLAN  The patient will try and watch dietary fats and I plan on rechecking him in 4 months with a CMP, lipid panel, TSH and vitamin D level    There are no Patient Instructions on file for this visit.  Return in about 4 months (around 6/14/2018) for with labs.

## 2018-03-21 RX ORDER — LEVOTHYROXINE SODIUM 88 UG/1
88 TABLET ORAL DAILY
Qty: 90 TABLET | Refills: 3 | Status: SHIPPED | OUTPATIENT
Start: 2018-03-21 | End: 2019-03-06 | Stop reason: SDUPTHER

## 2018-04-19 ENCOUNTER — APPOINTMENT (OUTPATIENT)
Dept: GENERAL RADIOLOGY | Facility: HOSPITAL | Age: 43
End: 2018-04-19

## 2018-04-19 PROCEDURE — 73610 X-RAY EXAM OF ANKLE: CPT | Performed by: EMERGENCY MEDICINE

## 2018-05-30 DIAGNOSIS — E78.49 OTHER HYPERLIPIDEMIA: ICD-10-CM

## 2018-05-30 DIAGNOSIS — E03.9 ACQUIRED HYPOTHYROIDISM: ICD-10-CM

## 2018-06-06 LAB
25(OH)D3+25(OH)D2 SERPL-MCNC: 41.6 NG/ML (ref 30–100)
ALBUMIN SERPL-MCNC: 4.1 G/DL (ref 3.5–5.2)
ALBUMIN/GLOB SERPL: 1.2 G/DL
ALP SERPL-CCNC: 68 U/L (ref 39–117)
ALT SERPL-CCNC: 30 U/L (ref 1–41)
AST SERPL-CCNC: 27 U/L (ref 1–40)
BILIRUB SERPL-MCNC: 0.8 MG/DL (ref 0.1–1.2)
BUN SERPL-MCNC: 14 MG/DL (ref 6–20)
BUN/CREAT SERPL: 14.7 (ref 7–25)
CALCIUM SERPL-MCNC: 9.5 MG/DL (ref 8.6–10.5)
CHLORIDE SERPL-SCNC: 104 MMOL/L (ref 98–107)
CHOLEST SERPL-MCNC: 182 MG/DL (ref 0–200)
CO2 SERPL-SCNC: 29.2 MMOL/L (ref 22–29)
CREAT SERPL-MCNC: 0.95 MG/DL (ref 0.76–1.27)
GFR SERPLBLD CREATININE-BSD FMLA CKD-EPI: 105 ML/MIN/1.73
GFR SERPLBLD CREATININE-BSD FMLA CKD-EPI: 87 ML/MIN/1.73
GLOBULIN SER CALC-MCNC: 3.5 GM/DL
GLUCOSE SERPL-MCNC: 81 MG/DL (ref 65–99)
HDLC SERPL-MCNC: 27 MG/DL (ref 40–60)
LDLC SERPL CALC-MCNC: 90 MG/DL (ref 0–100)
LDLC/HDLC SERPL: 3.34 {RATIO}
POTASSIUM SERPL-SCNC: 4.5 MMOL/L (ref 3.5–5.2)
PROT SERPL-MCNC: 7.6 G/DL (ref 6–8.5)
SODIUM SERPL-SCNC: 144 MMOL/L (ref 136–145)
TRIGL SERPL-MCNC: 324 MG/DL (ref 0–150)
TSH SERPL DL<=0.005 MIU/L-ACNC: 3.51 MIU/ML (ref 0.27–4.2)
VLDLC SERPL CALC-MCNC: 64.8 MG/DL (ref 5–40)

## 2018-06-13 ENCOUNTER — OFFICE VISIT (OUTPATIENT)
Dept: FAMILY MEDICINE CLINIC | Facility: CLINIC | Age: 43
End: 2018-06-13

## 2018-06-13 VITALS
RESPIRATION RATE: 18 BRPM | SYSTOLIC BLOOD PRESSURE: 142 MMHG | DIASTOLIC BLOOD PRESSURE: 78 MMHG | BODY MASS INDEX: 31.88 KG/M2 | HEIGHT: 77 IN | HEART RATE: 64 BPM | WEIGHT: 270 LBS | OXYGEN SATURATION: 98 % | TEMPERATURE: 98.4 F

## 2018-06-13 DIAGNOSIS — E78.49 OTHER HYPERLIPIDEMIA: ICD-10-CM

## 2018-06-13 DIAGNOSIS — E03.9 ACQUIRED HYPOTHYROIDISM: Primary | ICD-10-CM

## 2018-06-13 DIAGNOSIS — Z95.2 S/P AVR (AORTIC VALVE REPLACEMENT): ICD-10-CM

## 2018-06-13 DIAGNOSIS — J30.89 ENVIRONMENTAL AND SEASONAL ALLERGIES: ICD-10-CM

## 2018-06-13 PROCEDURE — 99213 OFFICE O/P EST LOW 20 MIN: CPT | Performed by: INTERNAL MEDICINE

## 2018-06-13 NOTE — PROGRESS NOTES
Subjective   Maged Christopher is a 42 y.o. male. Patient is here today for follow-up on his hyperlipidemia, hypothyroidism.  He also has environmental allergies and he has a history of aortic valve replacement and repair of ascending aortic aneurysm.  He's generally been stable and doing well.  He did fracture his leg and is walking with a cane.  He has gained about 11 pounds but is had no chest pain, shortness of breath, edema or myalgias  Chief Complaint   Patient presents with   • Hyperlipidemia   • Hypothyroidism          Vitals:    06/13/18 1315   BP: 142/78   Pulse: 64   Resp: 18   Temp: 98.4 °F (36.9 °C)   SpO2: 98%     The following portions of the patient's history were reviewed and updated as appropriate: allergies, current medications, past family history, past medical history, past social history, past surgical history and problem list.    Past Medical History:   Diagnosis Date   • Aneurysm of ascending aorta    • Aortic regurgitation    • Aortic valve insufficiency    • Bicuspid aortic valve    • Lung collapse    • Palpitations    • Valvular heart disease       No Known Allergies   Social History     Social History   • Marital status:      Spouse name: N/A   • Number of children: N/A   • Years of education: N/A     Occupational History   • Not on file.     Social History Main Topics   • Smoking status: Former Smoker   • Smokeless tobacco: Never Used   • Alcohol use Yes      Comment: once every 4 months/caffeine use 2 cups day   • Drug use: No   • Sexual activity: Not on file     Other Topics Concern   • Not on file     Social History Narrative   • No narrative on file        Current Outpatient Prescriptions:   •  cetirizine (ZyrTEC) 10 MG tablet, Take 10 mg by mouth daily., Disp: , Rfl:   •  cholecalciferol (VITAMIN D3) 1000 UNITS tablet, Take 1,000 Units by mouth Daily., Disp: , Rfl:   •  levothyroxine (SYNTHROID, LEVOTHROID) 88 MCG tablet, Take 1 tablet by mouth Daily., Disp: 90 tablet, Rfl: 3  •   metoprolol tartrate (LOPRESSOR) 25 MG tablet, Take 1 tablet by mouth 2 (Two) Times a Day., Disp: 180 tablet, Rfl: 3  •  montelukast (SINGULAIR) 10 MG tablet, Take 1 tablet by mouth daily., Disp: , Rfl:   •  Multiple Vitamin (MULTIVITAMIN) capsule, Take 1 capsule by mouth daily., Disp: , Rfl:      Objective     History of Present Illness     Review of Systems   Constitutional: Negative.    HENT: Negative.    Eyes: Negative.    Respiratory: Negative.    Cardiovascular: Negative.  Negative for chest pain.   Gastrointestinal: Negative.    Genitourinary: Negative.    Musculoskeletal: Negative.    Skin: Negative.    Neurological: Negative.    Psychiatric/Behavioral: Negative.        Physical Exam   Constitutional: He is oriented to person, place, and time. He appears well-developed and well-nourished.   Pleasant, cooperative no distress blood pressure 130/80   HENT:   Head: Normocephalic and atraumatic.   Eyes: Conjunctivae are normal. Pupils are equal, round, and reactive to light. No scleral icterus.   Neck: Normal range of motion. Neck supple. No thyromegaly present.   Cardiovascular: Normal rate, regular rhythm and normal heart sounds.    No murmur heard.  Pulmonary/Chest: Effort normal and breath sounds normal. No respiratory distress. He has no wheezes. He has no rales.   Musculoskeletal: Normal range of motion. He exhibits no edema.   Neurological: He is alert and oriented to person, place, and time.   Skin: Skin is warm and dry.   Psychiatric: He has a normal mood and affect. His behavior is normal.   Nursing note and vitals reviewed.      ASSESSMENT  CMP, TSH and vitamin D levels were all normal.  His lipid panel is stable with total cholesterol 182, HDL 27 and LDL of 90  #1-hyperlipidemia, reasonable control  #2-hypothyroidism, euthyroid on replacement  #3-history of aortic valve replacement and ascending aorta repair, asymptomatic  #4-environmental allergies, mild and currently controlled  #5-recent leg  fracture with decreased activity and weight gain     Problem List Items Addressed This Visit        Cardiovascular and Mediastinum    S/P AVR (aortic valve replacement)    Other hyperlipidemia       Endocrine    Acquired hypothyroidism - Primary       Other    Environmental and seasonal allergies          PLAN  The patient will continue current medicines as now.  I plan on rechecking him in 4 months with a CBC, CMP, lipid panel, TSH and free T4    There are no Patient Instructions on file for this visit.  Return in about 4 months (around 10/13/2018) for with labs.

## 2018-09-13 ENCOUNTER — HOSPITAL ENCOUNTER (OUTPATIENT)
Dept: CT IMAGING | Facility: HOSPITAL | Age: 43
Discharge: HOME | End: 2018-09-13
Attending: THORACIC SURGERY (CARDIOTHORACIC VASCULAR SURGERY) | Admitting: THORACIC SURGERY (CARDIOTHORACIC VASCULAR SURGERY)

## 2018-09-13 DIAGNOSIS — Z95.2 S/P AVR (AORTIC VALVE REPLACEMENT): ICD-10-CM

## 2018-09-13 DIAGNOSIS — Z98.890 STATUS POST ASCENDING AORTIC ANEURYSM REPAIR: ICD-10-CM

## 2018-09-13 DIAGNOSIS — Z86.79 STATUS POST ASCENDING AORTIC ANEURYSM REPAIR: ICD-10-CM

## 2018-09-13 PROCEDURE — 71250 CT THORAX DX C-: CPT

## 2018-09-25 ENCOUNTER — OFFICE VISIT (OUTPATIENT)
Dept: CARDIAC SURGERY | Facility: CLINIC | Age: 43
End: 2018-09-25

## 2018-09-25 VITALS
HEART RATE: 56 BPM | HEIGHT: 77 IN | OXYGEN SATURATION: 98 % | RESPIRATION RATE: 20 BRPM | TEMPERATURE: 98.3 F | WEIGHT: 255 LBS | BODY MASS INDEX: 30.11 KG/M2 | DIASTOLIC BLOOD PRESSURE: 83 MMHG | SYSTOLIC BLOOD PRESSURE: 129 MMHG

## 2018-09-25 DIAGNOSIS — Z95.2 S/P AVR (AORTIC VALVE REPLACEMENT): Primary | ICD-10-CM

## 2018-09-25 DIAGNOSIS — Z86.79 STATUS POST ASCENDING AORTIC ANEURYSM REPAIR: ICD-10-CM

## 2018-09-25 DIAGNOSIS — Z98.890 STATUS POST ASCENDING AORTIC ANEURYSM REPAIR: ICD-10-CM

## 2018-09-25 PROCEDURE — 99214 OFFICE O/P EST MOD 30 MIN: CPT | Performed by: NURSE PRACTITIONER

## 2018-09-27 NOTE — PROGRESS NOTES
9/25/2018      Subjective:      Tu Elias MD    Chief Complaint: Thoracic aortic aneurysm follow-up    History of Present Illness:       Dear Tu Zuluaga MD and Colleagues,    It was nice to see Maged Christopher in follow-up for his thoracic aorta. He is a 43 y.o. male with a history of root aneurysm and severe bicuspid aortic valve incompetence that underwent root repair, tissue aVR, and ascending aortic grafting with coronary reconstruction 5/2014 with Dr. Black.  He comes in today for routine follow-up for aneurysm surveillence.  The CT of chest on 9/13/2018 was reviewed with Dr. Black and is consistent with the radiologists reading of 3.5 cm, which is unchanged from 6/2017, his echo completed in 2017 demonstrated appropriate valve function.  He denies shortness of breath, chest/back pain, numbness/tingling/pain of extremities.  No vascular deficiencies or hyperextensible or hypermobile joints are noted on exam.  The patient states that he had Marfan's testing previously that was negative.  The patient's family history is negative for aneurysms or dissections although his grandmother and aunt both had bicuspid valves, negative for connective tissue disease, and positive for coronary disease in first degree family members with his father having had CABG at age 76.      Primary history: Hypothyroidism, hypertension, bicuspid valve    Surgical history: Repair/AVR/ascending aortic grafting 2014, pleurodesis 1998, sinus surgery 2017    Social history: Denies ever smoking, drugs approximate 1 beer per week, denies illicits, denies herbal use, denies stimulants.    Family history: As above      Patient Active Problem List   Diagnosis   • Status post ascending aortic aneurysm repair   • S/P AVR (aortic valve replacement)   • Anxiety about health   • Acquired hypothyroidism   • Environmental and seasonal allergies   • Other hyperlipidemia       Past Medical History:   Diagnosis Date   • Acute  conjunctivitis, right eye 04/2017   • Aneurysm of ascending aorta (CMS/HCC)     S/p AVR   • Aortic regurgitation    • Aortic valve insufficiency    • Bicuspid aortic valve    • Deviated nasal septum    • Hypothyroidism    • Injury of left hand 10/2017   • Lung collapse    • Nasal polyp 01/2018   • Palpitations    • Streptococcal pharyngitis 03/2017   • Valvular heart disease        Past Surgical History:   Procedure Laterality Date   • AORTIC VALVE REPAIR/REPLACEMENT     • OTHER SURGICAL HISTORY      ASC Aortic Aneurysm Graft; Coronary Reconstruction with Root Repalcement   • THORACOSCOPY      (Therapeutic) with Pleurodesis       No Known Allergies      Current Outpatient Prescriptions:   •  cetirizine (ZyrTEC) 10 MG tablet, Take 10 mg by mouth daily., Disp: , Rfl:   •  cholecalciferol (VITAMIN D3) 1000 UNITS tablet, Take 1,000 Units by mouth Daily., Disp: , Rfl:   •  levothyroxine (SYNTHROID, LEVOTHROID) 88 MCG tablet, Take 1 tablet by mouth Daily., Disp: 90 tablet, Rfl: 3  •  metoprolol tartrate (LOPRESSOR) 25 MG tablet, Take 1 tablet by mouth 2 (Two) Times a Day., Disp: 180 tablet, Rfl: 1  •  montelukast (SINGULAIR) 10 MG tablet, Take 1 tablet by mouth daily., Disp: , Rfl:   •  Multiple Vitamin (MULTIVITAMIN) capsule, Take 1 capsule by mouth daily., Disp: , Rfl:     Social History     Social History   • Marital status:      Spouse name: N/A   • Number of children: N/A   • Years of education: N/A     Occupational History   • Not on file.     Social History Main Topics   • Smoking status: Former Smoker     Types: Cigarettes   • Smokeless tobacco: Never Used   • Alcohol use Yes      Comment: Rare; Daily Caffine Use   • Drug use: No   • Sexual activity: Defer     Other Topics Concern   • Not on file     Social History Narrative   • No narrative on file       Family History   Problem Relation Age of Onset   • Hypertension Paternal Grandfather    • Valvular heart disease Other            Review of  Systems:  Review of Systems   Constitutional: Negative for activity change and fatigue.   HENT: Negative.    Eyes: Negative.    Respiratory: Negative for apnea, chest tightness, shortness of breath and wheezing.    Cardiovascular: Negative for chest pain, palpitations and leg swelling.   Gastrointestinal: Negative for abdominal distention, blood in stool, diarrhea, nausea and vomiting.   Endocrine: Negative.    Genitourinary: Negative for difficulty urinating, dysuria, flank pain, frequency, hematuria and urgency.   Musculoskeletal: Negative for arthralgias, gait problem, myalgias and neck pain.   Skin: Negative for pallor, rash and wound.   Allergic/Immunologic: Negative for environmental allergies and immunocompromised state.   Neurological: Negative for dizziness, seizures, syncope, weakness, light-headedness and numbness.   Hematological: Negative for adenopathy. Does not bruise/bleed easily.   Psychiatric/Behavioral: Negative.        Physical Exam:    Vital Signs:  Weight: 116 kg (255 lb)   Body mass index is 30.24 kg/m².  Temp: 98.3 °F (36.8 °C)   Heart Rate: 56   BP: 129/83     Physical Exam   Constitutional: He is oriented to person, place, and time. He appears well-developed and well-nourished. He is cooperative.   Eyes: No scleral icterus.   Neck: Neck supple. Normal carotid pulses and no JVD present. Carotid bruit is not present. No thyromegaly present.   Cardiovascular: Normal rate, regular rhythm, normal heart sounds and intact distal pulses.  Exam reveals no gallop and no friction rub.    No murmur heard.  Pulses:       Carotid pulses are 2+ on the right side, and 2+ on the left side.       Radial pulses are 2+ on the right side, and 2+ on the left side.        Posterior tibial pulses are 2+ on the right side, and 2+ on the left side.   Pulmonary/Chest: Effort normal and breath sounds normal. He has no wheezes. He has no rales.   Abdominal: Soft. Bowel sounds are normal. He exhibits no distension and  no mass. There is no hepatomegaly. There is no tenderness. There is no guarding and no CVA tenderness.   Musculoskeletal: He exhibits no edema, tenderness or deformity.   Lymphadenopathy:        Head (right side): No submental, no submandibular, no preauricular, no posterior auricular and no occipital adenopathy present.        Head (left side): No submental, no submandibular, no preauricular, no posterior auricular and no occipital adenopathy present.     He has no cervical adenopathy.        Right: No supraclavicular adenopathy present.        Left: No supraclavicular adenopathy present.   Neurological: He is alert and oriented to person, place, and time.   Skin: Skin is warm and dry. No rash noted. No erythema. No pallor.   Psychiatric: He has a normal mood and affect. His speech is normal and behavior is normal. Judgment and thought content normal.        Assessment:     1.  Bicuspid valve, ascending aortic aneurysm status post repair and tissue aVR-----stable residual aorta  2.  Hypertension-----well controlled on current treatment, cardiology managing  3.  Hyperlipidemia ------ managed by primary care, not currently on a statin    Recommendation/Plan:     Repeat CT of the chest without contrast in 2 years prior to clinic appointment    Continued risk factor modification of hypertension with a goal blood pressure less than 130/80, hyperlipidemia optimization, and continued avoidance of tobacco/nicotine products.    We discussed the importance of avoidance of over the counter decongestants and stimulants, specifically pseudoephedrine, for hypertension and aneurysm management    Initiation of low aerobic exercise is indicated to help reduce body habitus, assist with blood pressure and cholesterol control.       Although risk of rupture is low, emergency department presentation is warranted for acute chest, back, or abdominal pain, syncope, or stroke like symptoms    Thank you for allowing us to participate in his  care.    Regards,    Maddi Garcia, APRN      **All problems and history are new to this examiner.  Extensive review of records prior to and during patient visit

## 2018-10-15 DIAGNOSIS — E03.9 ACQUIRED HYPOTHYROIDISM: Primary | ICD-10-CM

## 2018-10-15 DIAGNOSIS — E78.49 OTHER HYPERLIPIDEMIA: ICD-10-CM

## 2018-10-15 LAB
ALBUMIN SERPL-MCNC: 4.6 G/DL (ref 3.5–5.2)
ALBUMIN/GLOB SERPL: 1.4 G/DL
ALP SERPL-CCNC: 80 U/L (ref 39–117)
ALT SERPL-CCNC: 33 U/L (ref 1–41)
AST SERPL-CCNC: 32 U/L (ref 1–40)
BASOPHILS # BLD AUTO: 0.04 10*3/MM3 (ref 0–0.2)
BASOPHILS NFR BLD AUTO: 0.6 % (ref 0–1.5)
BILIRUB SERPL-MCNC: 0.7 MG/DL (ref 0.1–1.2)
BUN SERPL-MCNC: 14 MG/DL (ref 6–20)
BUN/CREAT SERPL: 13.2 (ref 7–25)
CALCIUM SERPL-MCNC: 10 MG/DL (ref 8.6–10.5)
CHLORIDE SERPL-SCNC: 103 MMOL/L (ref 98–107)
CHOLEST SERPL-MCNC: 203 MG/DL (ref 0–200)
CHOLEST/HDLC SERPL: 7.81 {RATIO}
CO2 SERPL-SCNC: 26.1 MMOL/L (ref 22–29)
CREAT SERPL-MCNC: 1.06 MG/DL (ref 0.76–1.27)
DIFFERENTIAL COMMENT: NORMAL
EOSINOPHIL # BLD AUTO: 0.19 10*3/MM3 (ref 0–0.7)
EOSINOPHIL NFR BLD AUTO: 2.9 % (ref 0.3–6.2)
ERYTHROCYTE [DISTWIDTH] IN BLOOD BY AUTOMATED COUNT: 13.2 % (ref 11.5–14.5)
GLOBULIN SER CALC-MCNC: 3.3 GM/DL
GLUCOSE SERPL-MCNC: 73 MG/DL (ref 65–99)
HCT VFR BLD AUTO: 47.8 % (ref 40.4–52.2)
HDLC SERPL-MCNC: 26 MG/DL (ref 40–60)
HGB BLD-MCNC: 16.3 G/DL (ref 13.7–17.6)
IMM GRANULOCYTES # BLD: 0.01 10*3/MM3 (ref 0–0.03)
IMM GRANULOCYTES NFR BLD: 0.2 % (ref 0–0.5)
LDLC SERPL CALC-MCNC: 101 MG/DL (ref 0–100)
LYMPHOCYTES # BLD AUTO: 1.7 10*3/MM3 (ref 0.9–4.8)
LYMPHOCYTES NFR BLD AUTO: 25.5 % (ref 19.6–45.3)
MCH RBC QN AUTO: 30.9 PG (ref 27–32.7)
MCHC RBC AUTO-ENTMCNC: 34.1 G/DL (ref 32.6–36.4)
MCV RBC AUTO: 90.5 FL (ref 79.8–96.2)
MONOCYTES # BLD AUTO: 0.8 10*3/MM3 (ref 0.2–1.2)
MONOCYTES NFR BLD AUTO: 12 % (ref 5–12)
NEUTROPHILS # BLD AUTO: 3.93 10*3/MM3 (ref 1.9–8.1)
NEUTROPHILS NFR BLD AUTO: 59 % (ref 42.7–76)
PLATELET # BLD AUTO: 230 10*3/MM3 (ref 140–500)
PLATELET BLD QL SMEAR: NORMAL
POTASSIUM SERPL-SCNC: 4.5 MMOL/L (ref 3.5–5.2)
PROT SERPL-MCNC: 7.9 G/DL (ref 6–8.5)
RBC # BLD AUTO: 5.28 10*6/MM3 (ref 4.6–6)
RBC MORPH BLD: NORMAL
SODIUM SERPL-SCNC: 143 MMOL/L (ref 136–145)
T4 FREE SERPL-MCNC: 1.36 NG/DL (ref 0.93–1.7)
TRIGL SERPL-MCNC: 382 MG/DL (ref 0–150)
TSH SERPL DL<=0.005 MIU/L-ACNC: 3.93 MIU/ML (ref 0.27–4.2)
VLDLC SERPL CALC-MCNC: 76.4 MG/DL (ref 5–40)
WBC # BLD AUTO: 6.66 10*3/MM3 (ref 4.5–10.7)

## 2018-10-23 ENCOUNTER — OFFICE VISIT (OUTPATIENT)
Dept: FAMILY MEDICINE CLINIC | Facility: CLINIC | Age: 43
End: 2018-10-23

## 2018-10-23 VITALS
DIASTOLIC BLOOD PRESSURE: 74 MMHG | WEIGHT: 268.6 LBS | OXYGEN SATURATION: 98 % | TEMPERATURE: 98.9 F | BODY MASS INDEX: 31.71 KG/M2 | HEIGHT: 77 IN | HEART RATE: 55 BPM | SYSTOLIC BLOOD PRESSURE: 112 MMHG

## 2018-10-23 DIAGNOSIS — E78.49 OTHER HYPERLIPIDEMIA: Primary | ICD-10-CM

## 2018-10-23 DIAGNOSIS — Z86.79 STATUS POST ASCENDING AORTIC ANEURYSM REPAIR: ICD-10-CM

## 2018-10-23 DIAGNOSIS — J30.89 ENVIRONMENTAL AND SEASONAL ALLERGIES: ICD-10-CM

## 2018-10-23 DIAGNOSIS — Z95.2 S/P AVR (AORTIC VALVE REPLACEMENT): ICD-10-CM

## 2018-10-23 DIAGNOSIS — E03.9 ACQUIRED HYPOTHYROIDISM: ICD-10-CM

## 2018-10-23 DIAGNOSIS — Z98.890 STATUS POST ASCENDING AORTIC ANEURYSM REPAIR: ICD-10-CM

## 2018-10-23 PROCEDURE — 99214 OFFICE O/P EST MOD 30 MIN: CPT | Performed by: INTERNAL MEDICINE

## 2018-10-23 NOTE — PROGRESS NOTES
Subjective   Maged Christopher is a 43 y.o. male. Patient is here today for follow-up on his hyperlipidemia, history of aortic valve replacement in aortic aneurysm repair, seasonal allergies and hypothyroidism.  He's been doing well and is had no chest pain, shortness of breath, edema or myalgias.  Allergies is been stable.  He saw his cardiac surgeon recently and got a good report.   Chief Complaint   Patient presents with   • Hypothyroidism     lab follow up          Vitals:    10/23/18 0945   BP: 112/74   Pulse: 55   Temp: 98.9 °F (37.2 °C)   SpO2: 98%     The following portions of the patient's history were reviewed and updated as appropriate: allergies, current medications, past family history, past medical history, past social history, past surgical history and problem list.    Past Medical History:   Diagnosis Date   • Acute conjunctivitis, right eye 04/2017   • Aneurysm of ascending aorta (CMS/HCC)     S/p AVR   • Aortic regurgitation    • Aortic valve insufficiency    • Bicuspid aortic valve    • Deviated nasal septum    • Hypothyroidism    • Injury of left hand 10/2017   • Lung collapse    • Nasal polyp 01/2018   • Palpitations    • Streptococcal pharyngitis 03/2017   • Valvular heart disease       No Known Allergies   Social History     Social History   • Marital status:      Spouse name: N/A   • Number of children: N/A   • Years of education: N/A     Occupational History   • Not on file.     Social History Main Topics   • Smoking status: Former Smoker     Types: Cigarettes   • Smokeless tobacco: Never Used   • Alcohol use Yes      Comment: Rare; Daily Caffine Use   • Drug use: No   • Sexual activity: Defer     Other Topics Concern   • Not on file     Social History Narrative   • No narrative on file        Current Outpatient Prescriptions:   •  cetirizine (ZyrTEC) 10 MG tablet, Take 10 mg by mouth daily., Disp: , Rfl:   •  cholecalciferol (VITAMIN D3) 1000 UNITS tablet, Take 1,000 Units by mouth  Daily., Disp: , Rfl:   •  levothyroxine (SYNTHROID, LEVOTHROID) 88 MCG tablet, Take 1 tablet by mouth Daily., Disp: 90 tablet, Rfl: 3  •  metoprolol tartrate (LOPRESSOR) 25 MG tablet, Take 1 tablet by mouth 2 (Two) Times a Day., Disp: 180 tablet, Rfl: 1  •  montelukast (SINGULAIR) 10 MG tablet, Take 1 tablet by mouth daily., Disp: , Rfl:   •  Multiple Vitamin (MULTIVITAMIN) capsule, Take 1 capsule by mouth daily., Disp: , Rfl:      Objective     History of Present Illness     Review of Systems   Constitutional: Negative.    HENT: Negative.    Eyes: Negative.    Respiratory: Negative.    Cardiovascular: Negative.    Gastrointestinal: Negative.    Genitourinary: Negative.    Musculoskeletal: Negative.    Skin: Negative.    Neurological: Negative.    Psychiatric/Behavioral: Negative.        Physical Exam   Constitutional: He is oriented to person, place, and time. He appears well-developed and well-nourished.   Pleasant, cooperative no distress blood pressure 120/80   HENT:   Head: Normocephalic and atraumatic.   Eyes: Pupils are equal, round, and reactive to light. Conjunctivae are normal. No scleral icterus.   Neck: Normal range of motion. Neck supple. No thyromegaly present.   Cardiovascular: Normal rate, regular rhythm and normal heart sounds.    No murmur heard.  Pulmonary/Chest: Effort normal and breath sounds normal. No respiratory distress. He has no wheezes. He has no rales.   Musculoskeletal: Normal range of motion. He exhibits no edema.   Neurological: He is alert and oriented to person, place, and time.   Skin: Skin is warm and dry.   Psychiatric: He has a normal mood and affect. His behavior is normal.   Nursing note and vitals reviewed.      ASSESSMENT  CBC, CMP, TSH and free T4 were all normal.  Lipid panels a bit higher but stable with a total cholesterol 203, chronically low HDL,   #1-hyperlipidemia, diet controlled  #2-hypothyroidism, euthyroid on replacement  #3-seasonal allergies, controlled  on medications  #4-history of aortic valve replacement and ascending aortic aneurysm repair, stable and asymptomatic     Problem List Items Addressed This Visit        Cardiovascular and Mediastinum    Other hyperlipidemia - Primary       Endocrine    Acquired hypothyroidism       Other    Status post ascending aortic aneurysm repair    S/P AVR (aortic valve replacement)    Environmental and seasonal allergies          PLAN  I recommended the hepatitis A immunizations.  Patient will continue current medicines as now and I'll recheck him in 4 months with a CBC, CMP, lipid panel and TSH    There are no Patient Instructions on file for this visit.  Return in about 4 months (around 2/23/2019) for with labs.

## 2018-12-06 ENCOUNTER — OFFICE VISIT (OUTPATIENT)
Dept: CARDIOLOGY | Facility: CLINIC | Age: 43
End: 2018-12-06

## 2018-12-06 VITALS
SYSTOLIC BLOOD PRESSURE: 112 MMHG | BODY MASS INDEX: 31.69 KG/M2 | HEIGHT: 77 IN | DIASTOLIC BLOOD PRESSURE: 68 MMHG | WEIGHT: 268.4 LBS | OXYGEN SATURATION: 99 % | HEART RATE: 68 BPM

## 2018-12-06 DIAGNOSIS — Z86.79 STATUS POST ASCENDING AORTIC ANEURYSM REPAIR: Primary | ICD-10-CM

## 2018-12-06 DIAGNOSIS — G47.33 OBSTRUCTIVE SLEEP APNEA SYNDROME: ICD-10-CM

## 2018-12-06 DIAGNOSIS — Z95.2 S/P AVR (AORTIC VALVE REPLACEMENT): ICD-10-CM

## 2018-12-06 DIAGNOSIS — E78.49 OTHER HYPERLIPIDEMIA: ICD-10-CM

## 2018-12-06 DIAGNOSIS — Z98.890 STATUS POST ASCENDING AORTIC ANEURYSM REPAIR: Primary | ICD-10-CM

## 2018-12-06 PROCEDURE — 99213 OFFICE O/P EST LOW 20 MIN: CPT | Performed by: NURSE PRACTITIONER

## 2018-12-06 PROCEDURE — 93000 ELECTROCARDIOGRAM COMPLETE: CPT | Performed by: NURSE PRACTITIONER

## 2018-12-06 NOTE — PROGRESS NOTES
Patient Name: Maged Christopher  :1975  Age: 43 y.o.  Primary Cardiologist: Fidel Norton MD  Encounter Provider:  KATHLEEN Escamilla      Chief Complaint:   Chief Complaint   Patient presents with   • Cardiac Valve Problem     s/p AVR 1 yr follow up         HPI  Maged Christopher is a 43 y.o. male with a history significant for ascending aortic aneurysm repair, status post aortic valve replacement, hyperlipidemia.  Patient presents today for routine yearly follow-up.  Patient is new to me but I have reviewed prior medical record.    Patient states that over the past year he has done well.  He reports that he did have his lipids checked approximately one month ago reports that they were nonfasting and slightly elevated.  His primary care physician is going to recheck him again last seen in 2019.  Patient states that he is compliant with his CPAP machine.  Reports that he is not exercising routinely although he would like to start.  He denies any episodes of chest pain, shortness of breath, palpitations, swelling, fatigue.  He does report occasional episodes of lightheadedness when he has upper respiratory symptoms or when he has not worn his CPAP.    The following portions of the patient's history were reviewed and updated as appropriate: allergies, current medications, past family history, past medical history, past social history, past surgical history and problem list.    Current Outpatient Medications on File Prior to Visit   Medication Sig   • cetirizine (ZyrTEC) 10 MG tablet Take 10 mg by mouth daily.   • cholecalciferol (VITAMIN D3) 1000 UNITS tablet Take 1,000 Units by mouth Daily.   • levothyroxine (SYNTHROID, LEVOTHROID) 88 MCG tablet Take 1 tablet by mouth Daily.   • metoprolol tartrate (LOPRESSOR) 25 MG tablet Take 1 tablet by mouth 2 (Two) Times a Day.   • montelukast (SINGULAIR) 10 MG tablet Take 1 tablet by mouth daily.   • Multiple Vitamin (MULTIVITAMIN) capsule Take 1 capsule  "by mouth daily.     No current facility-administered medications on file prior to visit.          Review of Systems   Constitution: Negative for malaise/fatigue.   Cardiovascular: Negative for chest pain and leg swelling.   Respiratory: Negative for shortness of breath.    Neurological: Negative for light-headedness.   All other systems reviewed and are negative.      OBJECTIVE:   Vital Signs  Vitals:    12/06/18 1303   BP: 112/68   Pulse: 68   SpO2: 99%     Estimated body mass index is 31.83 kg/m² as calculated from the following:    Height as of this encounter: 195.6 cm (77\").    Weight as of this encounter: 122 kg (268 lb 6.4 oz).    Physical Exam   Constitutional: He is oriented to person, place, and time. Vital signs are normal. He appears well-developed and well-nourished.   Eyes: Conjunctivae are normal.   Neck: Carotid bruit is not present.   Cardiovascular: Normal rate and regular rhythm.   Murmur heard.   Harsh midsystolic murmur is present with a grade of 4/6 at the upper right sternal border radiating to the neck.  Pulmonary/Chest: Effort normal and breath sounds normal.   Abdominal: Normal appearance.   Musculoskeletal: Normal range of motion.   No pedal edema   Neurological: He is alert and oriented to person, place, and time. GCS eye subscore is 4. GCS verbal subscore is 5. GCS motor subscore is 6.   Skin: Skin is warm and dry.   Psychiatric: He has a normal mood and affect. His speech is normal and behavior is normal. Judgment and thought content normal. Cognition and memory are normal.         ECG 12 Lead  Date/Time: 12/6/2018 12:57 PM  Performed by: Molly Copeland APRN  Authorized by: Molly Copeland APRN   Comparison: compared with previous ECG from 12/6/2017  Similar to previous ECG  Rhythm: sinus rhythm  Rate: normal  BPM: 64  QRS axis: normal  Clinical impression: normal ECG            Cardiac Procedures:  1. Echocardiogram 6/26/17:  EF 49.7%.  Normal left ventricular diastolic function. "  Atrial volume is mildly increased.  Prosthetic aortic valve.  Prosthetic valve is normal.        ASSESSMENT:      Diagnosis Plan   1. Status post ascending aortic aneurysm repair     2. S/P AVR (aortic valve replacement)     3. Other hyperlipidemia     4. Obstructive sleep apnea syndrome           PLAN OF CARE:     1. Status post ascending aortic aneurysm repair  2. Status post AVR: Patient doing well.  Denies any chest pain, shortness of breath.  Reports occasional episodes of lightheadedness but notices this when he does not use his CPAP machine or when he is having upper respiratory symptoms.  Patient's last echocardiogram was checked and 2017.  He reports that Dr. Black still follows him and that he orders his echocardiogram.  I offered order patient's echocardiogram to be done within the next 6 months and patient would prefer for Dr. Black to do this.  3. Hyperlipidemia: Patient notes that he had his lipids checked in October 2018 and notes that they were elevated.  He notes that the blood was nonfasting.  His primary care physician is going to recheck his lipids in February 2019 fasting.  If they remain elevated he will likely be started on a statin drug.  4. Obstructive sleep apnea: Patient reports compliance with CPAP machine.  States that when he does not use his CPAP he notices that he feels a little bit lightheaded.        Thank you for allowing me to participate in the care of your patient,      Sincerely,   KATHLEEN Escamilla  [facility]  12/06/18  1:27 PM    **Divya Disclaimer:**  Much of this encounter note is an electronic transcription/translation of spoken language to printed text. The electronic translation of spoken language may permit erroneous, or at times, nonsensical words or phrases to be inadvertently transcribed. Although I have reviewed the note for such errors, some may still exist.

## 2019-02-19 DIAGNOSIS — E03.9 ACQUIRED HYPOTHYROIDISM: ICD-10-CM

## 2019-02-19 DIAGNOSIS — Z79.899 LONG TERM USE OF DRUG: ICD-10-CM

## 2019-02-19 DIAGNOSIS — E78.49 OTHER HYPERLIPIDEMIA: Primary | ICD-10-CM

## 2019-02-22 LAB
ALBUMIN SERPL-MCNC: 4.6 G/DL (ref 3.5–5.5)
ALBUMIN/GLOB SERPL: 1.5 {RATIO} (ref 1.2–2.2)
ALP SERPL-CCNC: 68 IU/L (ref 39–117)
ALT SERPL-CCNC: 40 IU/L (ref 0–44)
AST SERPL-CCNC: 37 IU/L (ref 0–40)
BASOPHILS # BLD AUTO: 0 X10E3/UL (ref 0–0.2)
BASOPHILS NFR BLD AUTO: 1 %
BILIRUB SERPL-MCNC: 0.9 MG/DL (ref 0–1.2)
BUN SERPL-MCNC: 13 MG/DL (ref 6–24)
BUN/CREAT SERPL: 13 (ref 9–20)
CALCIUM SERPL-MCNC: 9.7 MG/DL (ref 8.7–10.2)
CHLORIDE SERPL-SCNC: 102 MMOL/L (ref 96–106)
CHOLEST SERPL-MCNC: 196 MG/DL (ref 100–199)
CO2 SERPL-SCNC: 26 MMOL/L (ref 20–29)
CREAT SERPL-MCNC: 1 MG/DL (ref 0.76–1.27)
EOSINOPHIL # BLD AUTO: 0.1 X10E3/UL (ref 0–0.4)
EOSINOPHIL NFR BLD AUTO: 2 %
ERYTHROCYTE [DISTWIDTH] IN BLOOD BY AUTOMATED COUNT: 13.3 % (ref 12.3–15.4)
GLOBULIN SER CALC-MCNC: 3 G/DL (ref 1.5–4.5)
GLUCOSE SERPL-MCNC: 89 MG/DL (ref 65–99)
HCT VFR BLD AUTO: 47.6 % (ref 37.5–51)
HDLC SERPL-MCNC: 27 MG/DL
HGB BLD-MCNC: 16.1 G/DL (ref 13–17.7)
IMM GRANULOCYTES # BLD AUTO: 0 X10E3/UL (ref 0–0.1)
IMM GRANULOCYTES NFR BLD AUTO: 0 %
LDLC SERPL CALC-MCNC: 124 MG/DL (ref 0–99)
LDLC/HDLC SERPL: 4.6 RATIO (ref 0–3.6)
LYMPHOCYTES # BLD AUTO: 1.4 X10E3/UL (ref 0.7–3.1)
LYMPHOCYTES NFR BLD AUTO: 25 %
MCH RBC QN AUTO: 30.7 PG (ref 26.6–33)
MCHC RBC AUTO-ENTMCNC: 33.8 G/DL (ref 31.5–35.7)
MCV RBC AUTO: 91 FL (ref 79–97)
MONOCYTES # BLD AUTO: 0.6 X10E3/UL (ref 0.1–0.9)
MONOCYTES NFR BLD AUTO: 11 %
NEUTROPHILS # BLD AUTO: 3.5 X10E3/UL (ref 1.4–7)
NEUTROPHILS NFR BLD AUTO: 61 %
PLATELET # BLD AUTO: 221 X10E3/UL (ref 150–379)
POTASSIUM SERPL-SCNC: 4.4 MMOL/L (ref 3.5–5.2)
PROT SERPL-MCNC: 7.6 G/DL (ref 6–8.5)
RBC # BLD AUTO: 5.25 X10E6/UL (ref 4.14–5.8)
SODIUM SERPL-SCNC: 141 MMOL/L (ref 134–144)
TRIGL SERPL-MCNC: 227 MG/DL (ref 0–149)
TSH SERPL DL<=0.005 MIU/L-ACNC: 2.78 UIU/ML (ref 0.45–4.5)
VLDLC SERPL CALC-MCNC: 45 MG/DL (ref 5–40)
WBC # BLD AUTO: 5.7 X10E3/UL (ref 3.4–10.8)

## 2019-02-28 ENCOUNTER — OFFICE VISIT (OUTPATIENT)
Dept: FAMILY MEDICINE CLINIC | Facility: CLINIC | Age: 44
End: 2019-02-28

## 2019-02-28 VITALS
HEART RATE: 62 BPM | SYSTOLIC BLOOD PRESSURE: 140 MMHG | HEIGHT: 77 IN | DIASTOLIC BLOOD PRESSURE: 90 MMHG | WEIGHT: 277 LBS | RESPIRATION RATE: 15 BRPM | OXYGEN SATURATION: 98 % | BODY MASS INDEX: 32.71 KG/M2 | TEMPERATURE: 97.3 F

## 2019-02-28 DIAGNOSIS — E03.9 ACQUIRED HYPOTHYROIDISM: ICD-10-CM

## 2019-02-28 DIAGNOSIS — R03.0 PRE-HYPERTENSION: ICD-10-CM

## 2019-02-28 DIAGNOSIS — E78.49 OTHER HYPERLIPIDEMIA: Primary | ICD-10-CM

## 2019-02-28 DIAGNOSIS — J30.89 ENVIRONMENTAL AND SEASONAL ALLERGIES: ICD-10-CM

## 2019-02-28 PROCEDURE — 99214 OFFICE O/P EST MOD 30 MIN: CPT | Performed by: INTERNAL MEDICINE

## 2019-02-28 RX ORDER — ATORVASTATIN CALCIUM 10 MG/1
10 TABLET, FILM COATED ORAL NIGHTLY
Qty: 90 TABLET | Refills: 3 | Status: SHIPPED | OUTPATIENT
Start: 2019-02-28 | End: 2019-12-30

## 2019-02-28 NOTE — PROGRESS NOTES
Subjective   Maged Christopher is a 43 y.o. male. Patient is here today for follow-up on his hyperlipidemia, hypothyroidism and allergies.  He is also had aortic valve replacement and some palpitations.  His hearts been regular and he has had no problems with it.  He did have an allergy flare but it settled down with his allergy medications now.  He has gained about 9 pounds in weight.  He has had no chest pain, shortness of breath, edema or myalgias  Chief Complaint   Patient presents with   • Hypothyroidism   • Hypertension   • Hyperlipidemia          Vitals:    02/28/19 0929   BP: 140/90   Pulse: 62   Resp: 15   Temp: 97.3 °F (36.3 °C)   SpO2: 98%     The following portions of the patient's history were reviewed and updated as appropriate: allergies, current medications, past family history, past medical history, past social history, past surgical history and problem list.    Past Medical History:   Diagnosis Date   • Acute conjunctivitis, right eye 04/2017   • Aneurysm of ascending aorta (CMS/HCC)     S/p AVR   • Aortic regurgitation    • Aortic valve insufficiency    • Bicuspid aortic valve    • Deviated nasal septum    • Hypothyroidism    • Injury of left hand 10/2017   • Lung collapse    • Nasal polyp 01/2018   • Palpitations    • Streptococcal pharyngitis 03/2017   • Valvular heart disease       No Known Allergies   Social History     Socioeconomic History   • Marital status:      Spouse name: Not on file   • Number of children: Not on file   • Years of education: Not on file   • Highest education level: Not on file   Social Needs   • Financial resource strain: Not on file   • Food insecurity - worry: Not on file   • Food insecurity - inability: Not on file   • Transportation needs - medical: Not on file   • Transportation needs - non-medical: Not on file   Occupational History   • Not on file   Tobacco Use   • Smoking status: Former Smoker     Types: Cigarettes   • Smokeless tobacco: Never Used    Substance and Sexual Activity   • Alcohol use: Yes     Comment: Rare; Daily Caffine Use   • Drug use: No   • Sexual activity: Defer   Other Topics Concern   • Not on file   Social History Narrative   • Not on file        Current Outpatient Medications:   •  atorvastatin (LIPITOR) 10 MG tablet, Take 1 tablet by mouth Every Night., Disp: 90 tablet, Rfl: 3  •  cetirizine (ZyrTEC) 10 MG tablet, Take 10 mg by mouth daily., Disp: , Rfl:   •  cholecalciferol (VITAMIN D3) 1000 UNITS tablet, Take 1,000 Units by mouth Daily., Disp: , Rfl:   •  levothyroxine (SYNTHROID, LEVOTHROID) 88 MCG tablet, Take 1 tablet by mouth Daily., Disp: 90 tablet, Rfl: 3  •  metoprolol tartrate (LOPRESSOR) 25 MG tablet, Take 1 tablet by mouth 2 (Two) Times a Day., Disp: 180 tablet, Rfl: 1  •  montelukast (SINGULAIR) 10 MG tablet, Take 1 tablet by mouth daily., Disp: , Rfl:   •  Multiple Vitamin (MULTIVITAMIN) capsule, Take 1 capsule by mouth daily., Disp: , Rfl:      Objective     History of Present Illness     Review of Systems   Constitutional: Negative.    HENT: Negative.    Eyes: Negative.    Respiratory: Negative.    Cardiovascular: Negative.    Gastrointestinal: Negative.    Genitourinary: Negative.    Musculoskeletal: Negative.    Skin: Negative.    Neurological: Negative.    Psychiatric/Behavioral: Negative.        Physical Exam   Constitutional: He is oriented to person, place, and time. He appears well-developed and well-nourished.   Pleasant, cooperative no distress, blood pressure 135/80   HENT:   Head: Normocephalic and atraumatic.   Eyes: Conjunctivae are normal. Pupils are equal, round, and reactive to light. No scleral icterus.   Neck: Normal range of motion. Neck supple. No thyromegaly present.   Cardiovascular: Normal rate, regular rhythm and normal heart sounds.   Pulmonary/Chest: Effort normal and breath sounds normal. No respiratory distress. He has no wheezes. He has no rales.   Musculoskeletal: Normal range of motion. He  exhibits no edema.   Neurological: He is alert and oriented to person, place, and time.   Skin: Skin is warm and dry.   Psychiatric: He has a normal mood and affect. His behavior is normal.   Nursing note and vitals reviewed.      ASSESSMENT CBC and CMP are completely normal and TSH was quite normal.  Lipid panel had a total cholesterol 196 but a chronically low HDL of 27 and LDL increased to 124.  #1-pre-hypertension, controlled  #2-hyperlipidemia, not optimally controlled by diet  #3-hypothyroidism, euthyroid on replacement  #4-history of aortic valve replacement, asymptomatic     Problem List Items Addressed This Visit        Cardiovascular and Mediastinum    Other hyperlipidemia - Primary    Relevant Medications    atorvastatin (LIPITOR) 10 MG tablet       Endocrine    Acquired hypothyroidism       Other    Environmental and seasonal allergies          PLAN I recommended to hepatitis A immunizations.  After discussion with the patient, we are going to try atorvastatin 10 mg daily for his cholesterol.  He will continue other medicines and I will recheck him in 4 months with a CBC, CMP, lipid panel, TSH and free T4    There are no Patient Instructions on file for this visit.  Return in about 4 months (around 6/28/2019) for with labs.

## 2019-03-06 RX ORDER — LEVOTHYROXINE SODIUM 88 MCG
TABLET ORAL
Qty: 90 TABLET | Refills: 3 | Status: SHIPPED | OUTPATIENT
Start: 2019-03-06 | End: 2020-02-19

## 2019-06-03 DIAGNOSIS — E78.49 OTHER HYPERLIPIDEMIA: Primary | ICD-10-CM

## 2019-06-03 DIAGNOSIS — E03.9 ACQUIRED HYPOTHYROIDISM: ICD-10-CM

## 2019-06-12 LAB
ALBUMIN SERPL-MCNC: 4.2 G/DL (ref 3.5–5.2)
ALBUMIN/GLOB SERPL: 1.5 G/DL
ALP SERPL-CCNC: 68 U/L (ref 39–117)
ALT SERPL-CCNC: 29 U/L (ref 1–41)
AST SERPL-CCNC: 26 U/L (ref 1–40)
BASOPHILS # BLD AUTO: 0.04 10*3/MM3 (ref 0–0.2)
BASOPHILS NFR BLD AUTO: 0.8 % (ref 0–1.5)
BILIRUB SERPL-MCNC: 0.9 MG/DL (ref 0.2–1.2)
BUN SERPL-MCNC: 14 MG/DL (ref 6–20)
BUN/CREAT SERPL: 14.4 (ref 7–25)
CALCIUM SERPL-MCNC: 9.3 MG/DL (ref 8.6–10.5)
CHLORIDE SERPL-SCNC: 104 MMOL/L (ref 98–107)
CHOLEST SERPL-MCNC: 132 MG/DL (ref 0–200)
CO2 SERPL-SCNC: 28.9 MMOL/L (ref 22–29)
CREAT SERPL-MCNC: 0.97 MG/DL (ref 0.76–1.27)
EOSINOPHIL # BLD AUTO: 0.14 10*3/MM3 (ref 0–0.4)
EOSINOPHIL NFR BLD AUTO: 3 % (ref 0.3–6.2)
ERYTHROCYTE [DISTWIDTH] IN BLOOD BY AUTOMATED COUNT: 12.7 % (ref 12.3–15.4)
GLOBULIN SER CALC-MCNC: 2.8 GM/DL
GLUCOSE SERPL-MCNC: 88 MG/DL (ref 65–99)
HCT VFR BLD AUTO: 48.5 % (ref 37.5–51)
HDLC SERPL-MCNC: 30 MG/DL (ref 40–60)
HGB BLD-MCNC: 15.6 G/DL (ref 13–17.7)
IMM GRANULOCYTES # BLD AUTO: 0.01 10*3/MM3 (ref 0–0.05)
IMM GRANULOCYTES NFR BLD AUTO: 0.2 % (ref 0–0.5)
LDLC SERPL CALC-MCNC: 66 MG/DL (ref 0–100)
LDLC/HDLC SERPL: 2.2 {RATIO}
LYMPHOCYTES # BLD AUTO: 1.54 10*3/MM3 (ref 0.7–3.1)
LYMPHOCYTES NFR BLD AUTO: 32.6 % (ref 19.6–45.3)
MCH RBC QN AUTO: 30.2 PG (ref 26.6–33)
MCHC RBC AUTO-ENTMCNC: 32.2 G/DL (ref 31.5–35.7)
MCV RBC AUTO: 93.8 FL (ref 79–97)
MONOCYTES # BLD AUTO: 0.53 10*3/MM3 (ref 0.1–0.9)
MONOCYTES NFR BLD AUTO: 11.2 % (ref 5–12)
NEUTROPHILS # BLD AUTO: 2.47 10*3/MM3 (ref 1.7–7)
NEUTROPHILS NFR BLD AUTO: 52.2 % (ref 42.7–76)
NRBC BLD AUTO-RTO: 0.2 /100 WBC (ref 0–0.2)
PLATELET # BLD AUTO: 215 10*3/MM3 (ref 140–450)
POTASSIUM SERPL-SCNC: 4.8 MMOL/L (ref 3.5–5.2)
PROT SERPL-MCNC: 7 G/DL (ref 6–8.5)
RBC # BLD AUTO: 5.17 10*6/MM3 (ref 4.14–5.8)
SODIUM SERPL-SCNC: 144 MMOL/L (ref 136–145)
T4 FREE SERPL-MCNC: 1.27 NG/DL (ref 0.93–1.7)
TRIGL SERPL-MCNC: 180 MG/DL (ref 0–150)
TSH SERPL DL<=0.005 MIU/L-ACNC: 3.05 MIU/ML (ref 0.27–4.2)
VLDLC SERPL CALC-MCNC: 36 MG/DL
WBC # BLD AUTO: 4.73 10*3/MM3 (ref 3.4–10.8)

## 2019-06-19 ENCOUNTER — OFFICE VISIT (OUTPATIENT)
Dept: FAMILY MEDICINE CLINIC | Facility: CLINIC | Age: 44
End: 2019-06-19

## 2019-06-19 VITALS
TEMPERATURE: 97 F | SYSTOLIC BLOOD PRESSURE: 120 MMHG | OXYGEN SATURATION: 98 % | WEIGHT: 279 LBS | HEART RATE: 62 BPM | HEIGHT: 77 IN | BODY MASS INDEX: 32.94 KG/M2 | DIASTOLIC BLOOD PRESSURE: 80 MMHG | RESPIRATION RATE: 15 BRPM

## 2019-06-19 DIAGNOSIS — E03.9 ACQUIRED HYPOTHYROIDISM: ICD-10-CM

## 2019-06-19 DIAGNOSIS — I38 VALVULAR HEART DISEASE: ICD-10-CM

## 2019-06-19 DIAGNOSIS — Z95.2 S/P AVR (AORTIC VALVE REPLACEMENT): ICD-10-CM

## 2019-06-19 DIAGNOSIS — E78.49 OTHER HYPERLIPIDEMIA: ICD-10-CM

## 2019-06-19 DIAGNOSIS — R03.0 PRE-HYPERTENSION: Primary | ICD-10-CM

## 2019-06-19 DIAGNOSIS — J30.89 ENVIRONMENTAL AND SEASONAL ALLERGIES: ICD-10-CM

## 2019-06-19 DIAGNOSIS — R22.9 LOCALIZED SKIN MASS, LUMP, OR SWELLING: ICD-10-CM

## 2019-06-19 PROCEDURE — 99214 OFFICE O/P EST MOD 30 MIN: CPT | Performed by: INTERNAL MEDICINE

## 2019-06-19 NOTE — PROGRESS NOTES
Subjective   Maged Christopher is a 43 y.o. male. Patient is here today for follow-up on his pre-hypertension, hyperlipidemia, history of valvular heart disease with aortic valve replacement hypothyroidism.  He also has allergies.  He is generally been doing well.  He does have an upcoming cardiology appointment.  Has any chest pain, shortness of breath, edema or myalgias and is tolerating medications.  Chief Complaint   Patient presents with   • Hyperlipidemia   • Hypothyroidism   • Hypertension          Vitals:    06/19/19 0754   BP: 120/80   Pulse: 62   Resp: 15   Temp: 97 °F (36.1 °C)   SpO2: 98%     The following portions of the patient's history were reviewed and updated as appropriate: allergies, current medications, past family history, past medical history, past social history, past surgical history and problem list.    Past Medical History:   Diagnosis Date   • Aneurysm of ascending aorta (CMS/HCC)     S/p AVR   • Aortic regurgitation    • Aortic valve insufficiency    • Bicuspid aortic valve    • Deviated nasal septum    • Hypothyroidism    • Lung collapse    • Nasal polyp 01/2018   • Palpitations    • Valvular heart disease       No Known Allergies   Social History     Socioeconomic History   • Marital status:      Spouse name: Not on file   • Number of children: Not on file   • Years of education: Not on file   • Highest education level: Not on file   Tobacco Use   • Smoking status: Former Smoker     Types: Cigarettes   • Smokeless tobacco: Never Used   Substance and Sexual Activity   • Alcohol use: Yes     Comment: Rare; Daily Caffine Use   • Drug use: No   • Sexual activity: Defer        Current Outpatient Medications:   •  atorvastatin (LIPITOR) 10 MG tablet, Take 1 tablet by mouth Every Night., Disp: 90 tablet, Rfl: 3  •  cetirizine (ZyrTEC) 10 MG tablet, Take 10 mg by mouth daily., Disp: , Rfl:   •  cholecalciferol (VITAMIN D3) 1000 UNITS tablet, Take 1,000 Units by mouth Daily., Disp: , Rfl:    •  metoprolol tartrate (LOPRESSOR) 25 MG tablet, TAKE 1 TABLET TWICE A DAY, Disp: 180 tablet, Rfl: 1  •  montelukast (SINGULAIR) 10 MG tablet, Take 1 tablet by mouth daily., Disp: , Rfl:   •  Multiple Vitamin (MULTIVITAMIN) capsule, Take 1 capsule by mouth daily., Disp: , Rfl:   •  SYNTHROID 88 MCG tablet, TAKE 1 TABLET DAILY, Disp: 90 tablet, Rfl: 3     Objective     History of Present Illness     Review of Systems   Constitutional: Negative.    HENT: Negative.    Eyes: Negative.    Respiratory: Negative.    Cardiovascular: Negative.    Gastrointestinal: Negative.    Genitourinary: Negative.    Musculoskeletal: Negative.    Skin: Negative.    Neurological: Negative.    Psychiatric/Behavioral: Negative.        Physical Exam   Constitutional: He is oriented to person, place, and time. He appears well-developed and well-nourished.   Pleasant, cooperative no distress blood pressure 110/70   HENT:   Head: Normocephalic and atraumatic.   Eyes: Conjunctivae are normal. Pupils are equal, round, and reactive to light. No scleral icterus.   Neck: Normal range of motion. Neck supple. No thyromegaly present.   Cardiovascular: Normal rate and regular rhythm.   Murmur heard.  2/6 to 3/6 systolic murmur at the upper sternal border   Pulmonary/Chest: Effort normal and breath sounds normal. No respiratory distress. He has no wheezes. He has no rales.   Musculoskeletal: Normal range of motion. He exhibits no edema.   Neurological: He is alert and oriented to person, place, and time.   Skin: Skin is warm and dry.   Psychiatric: He has a normal mood and affect. His behavior is normal.   Nursing note and vitals reviewed.      ASSESSMENT CBC, CMP and TSH and free T4 were all completely normal.  Lipid panel was much improved with total cholesterol 132, HDL low at 30 but LDL of 66    #1-pre-hypertension with excellent blood pressure today  #2-hyperlipidemia, excellent control on atorvastatin  #3-hypothyroidism, euthyroid on  replacement  #4-history of aortic valve replacement with systolic heart murmur, stable  #7-caokkv-wbdipdhho mass on his right shoulder, dermatology referral     Problem List Items Addressed This Visit        Cardiovascular and Mediastinum    Other hyperlipidemia    Pre-hypertension - Primary    Valvular heart disease       Endocrine    Acquired hypothyroidism       Musculoskeletal and Integument    Localized skin mass, lump, or swelling    Relevant Orders    Ambulatory Referral to Dermatology       Other    S/P AVR (aortic valve replacement)    Environmental and seasonal allergies          PLAN the patient will continue current medicines as now.  I am referring him to Dr. Coto, dermatology for the skin mass on his right shoulder.  I would like to recheck him in 6 months with a complete physical exam including a CBC, CMP, lipid panel, TSH and free T4, urinalysis but no chest x-ray or EKG    There are no Patient Instructions on file for this visit.  Return in about 6 months (around 12/19/2019) for with labs, Annual physical.

## 2019-12-12 ENCOUNTER — OFFICE VISIT (OUTPATIENT)
Dept: CARDIOLOGY | Facility: CLINIC | Age: 44
End: 2019-12-12

## 2019-12-12 VITALS
HEIGHT: 77 IN | HEART RATE: 59 BPM | WEIGHT: 281 LBS | DIASTOLIC BLOOD PRESSURE: 84 MMHG | SYSTOLIC BLOOD PRESSURE: 120 MMHG | BODY MASS INDEX: 33.18 KG/M2

## 2019-12-12 DIAGNOSIS — Z95.2 S/P AVR (AORTIC VALVE REPLACEMENT): Primary | ICD-10-CM

## 2019-12-12 PROCEDURE — 93000 ELECTROCARDIOGRAM COMPLETE: CPT | Performed by: INTERNAL MEDICINE

## 2019-12-12 PROCEDURE — 99213 OFFICE O/P EST LOW 20 MIN: CPT | Performed by: INTERNAL MEDICINE

## 2019-12-12 NOTE — PROGRESS NOTES
Subjective:     Encounter Date:12/12/2019      Patient ID: Maged Christopher is a 44 y.o. male.    Chief Complaint: Aortic valve replacement as well as dilatation of proximal aorta.  History of Present Illness    44-year-old gentleman who presents today for reevaluation.  Clinically he is doing great blood pressure remains nice and low he has no cardiac symptoms.  Dr. Cisse follows his aortic root he had a CT about a year ago.  Been several years since his last echo.  Clinically he is doing well.  Review of Systems   All other systems reviewed and are negative.        ECG 12 Lead  Date/Time: 12/12/2019 3:50 PM  Performed by: Fidel Norton MD  Authorized by: Fidel Norton MD   Comparison: compared with previous ECG from 12/6/2018  Similar to previous ECG  Rhythm: sinus rhythm    Clinical impression: normal ECG               Objective:     Physical Exam   Constitutional: He is oriented to person, place, and time. He appears well-developed.   HENT:   Head: Normocephalic.   Eyes: Conjunctivae are normal.   Neck: Normal range of motion.   Cardiovascular: Normal rate, regular rhythm and normal heart sounds.   Pulmonary/Chest: Breath sounds normal.   Abdominal: Soft. Bowel sounds are normal.   Musculoskeletal: Normal range of motion. He exhibits no edema.   Neurological: He is alert and oriented to person, place, and time.   Skin: Skin is warm and dry.   Psychiatric: He has a normal mood and affect. His behavior is normal.   Vitals reviewed.      Lab Review:       Assessment:          Diagnosis Plan   1. S/P AVR (aortic valve replacement)            Plan:       1.  Aortic valve replacement.  Clinically doing well valve sounds good would favor an echo this time.  We will see patient back in about 2 years sooner if he has issues continue the same.

## 2019-12-30 RX ORDER — ATORVASTATIN CALCIUM 10 MG/1
TABLET, FILM COATED ORAL
Qty: 90 TABLET | Refills: 3 | Status: SHIPPED | OUTPATIENT
Start: 2019-12-30 | End: 2020-02-24 | Stop reason: SDUPTHER

## 2020-02-19 RX ORDER — LEVOTHYROXINE SODIUM 88 MCG
TABLET ORAL
Qty: 90 TABLET | Refills: 0 | Status: SHIPPED | OUTPATIENT
Start: 2020-02-19 | End: 2020-05-18

## 2020-02-21 ENCOUNTER — TELEPHONE (OUTPATIENT)
Dept: FAMILY MEDICINE CLINIC | Facility: CLINIC | Age: 45
End: 2020-02-21

## 2020-02-21 NOTE — TELEPHONE ENCOUNTER
Pt states that he is awaiting a refill on listed meds below  Followed by pharmacy information.     atorvastatin (LIPITOR) 10 MG tablet    [facility]  [facility] - [place], [place] - [address] AT [address] [facility] - [phone]  - [phone] FX Phone:  [phone] Fax:  [phone]    Address:  [address], [facility] [address]       med is a mail order med.    Pt phone number is [phone]

## 2020-02-24 RX ORDER — ATORVASTATIN CALCIUM 10 MG/1
10 TABLET, FILM COATED ORAL NIGHTLY
Qty: 90 TABLET | Refills: 1 | Status: SHIPPED | OUTPATIENT
Start: 2020-02-24 | End: 2020-07-15 | Stop reason: SDUPTHER

## 2020-05-18 RX ORDER — LEVOTHYROXINE SODIUM 88 MCG
TABLET ORAL
Qty: 90 TABLET | Refills: 0 | Status: SHIPPED | OUTPATIENT
Start: 2020-05-18 | End: 2020-07-15 | Stop reason: SDUPTHER

## 2020-06-29 DIAGNOSIS — E03.9 ACQUIRED HYPOTHYROIDISM: ICD-10-CM

## 2020-06-29 DIAGNOSIS — E78.49 OTHER HYPERLIPIDEMIA: Primary | ICD-10-CM

## 2020-06-29 DIAGNOSIS — R03.0 PRE-HYPERTENSION: ICD-10-CM

## 2020-07-09 LAB
ALBUMIN SERPL-MCNC: 4.6 G/DL (ref 3.5–5.2)
ALBUMIN/GLOB SERPL: 1.8 G/DL
ALP SERPL-CCNC: 63 U/L (ref 39–117)
ALT SERPL-CCNC: 33 U/L (ref 1–41)
APPEARANCE UR: CLEAR
AST SERPL-CCNC: 31 U/L (ref 1–40)
BACTERIA #/AREA URNS HPF: NORMAL /HPF
BASOPHILS # BLD AUTO: 0.03 10*3/MM3 (ref 0–0.2)
BASOPHILS NFR BLD AUTO: 0.5 % (ref 0–1.5)
BILIRUB SERPL-MCNC: 0.6 MG/DL (ref 0–1.2)
BILIRUB UR QL STRIP: NEGATIVE
BUN SERPL-MCNC: 14 MG/DL (ref 6–20)
BUN/CREAT SERPL: 14.7 (ref 7–25)
CALCIUM SERPL-MCNC: 9.4 MG/DL (ref 8.6–10.5)
CASTS URNS MICRO: NORMAL
CHLORIDE SERPL-SCNC: 103 MMOL/L (ref 98–107)
CHOLEST SERPL-MCNC: 122 MG/DL (ref 0–200)
CO2 SERPL-SCNC: 28.9 MMOL/L (ref 22–29)
COLOR UR: YELLOW
CREAT SERPL-MCNC: 0.95 MG/DL (ref 0.76–1.27)
EOSINOPHIL # BLD AUTO: 0.11 10*3/MM3 (ref 0–0.4)
EOSINOPHIL NFR BLD AUTO: 1.8 % (ref 0.3–6.2)
EPI CELLS #/AREA URNS HPF: NORMAL /HPF
ERYTHROCYTE [DISTWIDTH] IN BLOOD BY AUTOMATED COUNT: 12.3 % (ref 12.3–15.4)
GLOBULIN SER CALC-MCNC: 2.6 GM/DL
GLUCOSE SERPL-MCNC: 88 MG/DL (ref 65–99)
GLUCOSE UR QL: NEGATIVE
HCT VFR BLD AUTO: 47.1 % (ref 37.5–51)
HDLC SERPL-MCNC: 30 MG/DL (ref 40–60)
HGB BLD-MCNC: 16 G/DL (ref 13–17.7)
HGB UR QL STRIP: NEGATIVE
IMM GRANULOCYTES # BLD AUTO: 0.02 10*3/MM3 (ref 0–0.05)
IMM GRANULOCYTES NFR BLD AUTO: 0.3 % (ref 0–0.5)
KETONES UR QL STRIP: NEGATIVE
LDLC SERPL CALC-MCNC: 64 MG/DL (ref 0–100)
LDLC/HDLC SERPL: 2.14 {RATIO}
LEUKOCYTE ESTERASE UR QL STRIP: NEGATIVE
LYMPHOCYTES # BLD AUTO: 1.22 10*3/MM3 (ref 0.7–3.1)
LYMPHOCYTES NFR BLD AUTO: 20.2 % (ref 19.6–45.3)
MCH RBC QN AUTO: 30 PG (ref 26.6–33)
MCHC RBC AUTO-ENTMCNC: 34 G/DL (ref 31.5–35.7)
MCV RBC AUTO: 88.2 FL (ref 79–97)
MONOCYTES # BLD AUTO: 0.73 10*3/MM3 (ref 0.1–0.9)
MONOCYTES NFR BLD AUTO: 12.1 % (ref 5–12)
NEUTROPHILS # BLD AUTO: 3.92 10*3/MM3 (ref 1.7–7)
NEUTROPHILS NFR BLD AUTO: 65.1 % (ref 42.7–76)
NITRITE UR QL STRIP: NEGATIVE
NRBC BLD AUTO-RTO: 0 /100 WBC (ref 0–0.2)
PH UR STRIP: 6.5 [PH] (ref 5–8)
PLATELET # BLD AUTO: 181 10*3/MM3 (ref 140–450)
POTASSIUM SERPL-SCNC: 4.2 MMOL/L (ref 3.5–5.2)
PROT SERPL-MCNC: 7.2 G/DL (ref 6–8.5)
PROT UR QL STRIP: NEGATIVE
RBC # BLD AUTO: 5.34 10*6/MM3 (ref 4.14–5.8)
RBC #/AREA URNS HPF: NORMAL /HPF
SODIUM SERPL-SCNC: 141 MMOL/L (ref 136–145)
SP GR UR: 1.01 (ref 1–1.03)
T4 FREE SERPL-MCNC: 1.34 NG/DL (ref 0.93–1.7)
TRIGL SERPL-MCNC: 139 MG/DL (ref 0–150)
TSH SERPL DL<=0.005 MIU/L-ACNC: 3.06 UIU/ML (ref 0.27–4.2)
UROBILINOGEN UR STRIP-MCNC: NORMAL MG/DL
VLDLC SERPL CALC-MCNC: 27.8 MG/DL
WBC # BLD AUTO: 6.03 10*3/MM3 (ref 3.4–10.8)
WBC #/AREA URNS HPF: NORMAL /HPF

## 2020-07-13 ENCOUNTER — TELEPHONE (OUTPATIENT)
Dept: CARDIOLOGY | Facility: CLINIC | Age: 45
End: 2020-07-13

## 2020-07-13 NOTE — TELEPHONE ENCOUNTER
Pt is having a cyst removed from his shoulder on Friday and wants to know if he needs antibiotics-if so, please send in.    Pt # [phone]    Thanks,  Sharron

## 2020-07-14 RX ORDER — AMOXICILLIN 500 MG/1
2000 CAPSULE ORAL TAKE AS DIRECTED
COMMUNITY
End: 2020-07-14 | Stop reason: SDUPTHER

## 2020-07-14 RX ORDER — AMOXICILLIN 500 MG/1
CAPSULE ORAL
Qty: 4 CAPSULE | Refills: 0 | Status: SHIPPED | OUTPATIENT
Start: 2020-07-14 | End: 2021-08-05

## 2020-07-14 NOTE — TELEPHONE ENCOUNTER
Spoke to pt who verbalized understanding of the msg.  I told him to call bk if he had any trouble with the Rx or had questions./prt

## 2020-07-15 ENCOUNTER — OFFICE VISIT (OUTPATIENT)
Dept: FAMILY MEDICINE CLINIC | Facility: CLINIC | Age: 45
End: 2020-07-15

## 2020-07-15 VITALS
RESPIRATION RATE: 16 BRPM | TEMPERATURE: 98 F | BODY MASS INDEX: 30.98 KG/M2 | SYSTOLIC BLOOD PRESSURE: 120 MMHG | DIASTOLIC BLOOD PRESSURE: 80 MMHG | WEIGHT: 262.4 LBS | HEART RATE: 65 BPM | OXYGEN SATURATION: 98 % | HEIGHT: 77 IN

## 2020-07-15 DIAGNOSIS — I38 VALVULAR HEART DISEASE: Primary | ICD-10-CM

## 2020-07-15 DIAGNOSIS — E03.9 ACQUIRED HYPOTHYROIDISM: ICD-10-CM

## 2020-07-15 DIAGNOSIS — Z95.2 S/P AVR (AORTIC VALVE REPLACEMENT): ICD-10-CM

## 2020-07-15 DIAGNOSIS — E78.49 OTHER HYPERLIPIDEMIA: ICD-10-CM

## 2020-07-15 DIAGNOSIS — R03.0 PRE-HYPERTENSION: ICD-10-CM

## 2020-07-15 PROCEDURE — 99214 OFFICE O/P EST MOD 30 MIN: CPT | Performed by: INTERNAL MEDICINE

## 2020-07-15 RX ORDER — MOMETASONE FUROATE 1 MG/G
CREAM TOPICAL
COMMUNITY
Start: 2020-07-13

## 2020-07-15 RX ORDER — LEVOTHYROXINE SODIUM 88 UG/1
88 TABLET ORAL DAILY
Qty: 90 TABLET | Refills: 3 | Status: SHIPPED | OUTPATIENT
Start: 2020-07-15 | End: 2021-07-27

## 2020-07-15 RX ORDER — ATORVASTATIN CALCIUM 10 MG/1
10 TABLET, FILM COATED ORAL NIGHTLY
Qty: 90 TABLET | Refills: 3 | Status: SHIPPED | OUTPATIENT
Start: 2020-07-15 | End: 2021-07-27

## 2020-07-15 NOTE — PROGRESS NOTES
Subjective   Maged Christopher is a 44 y.o. male. Patient is here today for follow-up on his pre-hypertension, hyperlipidemia, hypothyroidism and history of aortic valve replacement and repair of the ascending aorta.  He is also followed by cardiology and chest surgery and has been feeling well and has no acute complaints.  He does have upcoming appointments and will be getting CT scan probably by chest surgery.  He has had no shortness of breath or edema and energy level feels good.  Chief Complaint   Patient presents with   • Hyperlipidemia     HYPOTHYROIDISM- FOLLOW UP LABS          Vitals:    07/15/20 0930   BP: 120/80   Pulse: 65   Resp: 16   Temp: 98 °F (36.7 °C)   SpO2: 98%     Body mass index is 31.11 kg/m².  The following portions of the patient's history were reviewed and updated as appropriate: allergies, current medications, past family history, past medical history, past social history, past surgical history and problem list.    Past Medical History:   Diagnosis Date   • Aneurysm of ascending aorta (CMS/HCC)     S/p AVR   • Aortic regurgitation    • Aortic valve insufficiency    • Bicuspid aortic valve    • Deviated nasal septum    • Hypothyroidism    • Lung collapse    • Nasal polyp 01/2018   • Palpitations    • Valvular heart disease       No Known Allergies   Social History     Socioeconomic History   • Marital status:      Spouse name: Not on file   • Number of children: Not on file   • Years of education: Not on file   • Highest education level: Not on file   Tobacco Use   • Smoking status: Former Smoker     Types: Cigarettes   • Smokeless tobacco: Never Used   Substance and Sexual Activity   • Alcohol use: Yes     Comment: Rare; Daily Caffine Use   • Drug use: No   • Sexual activity: Defer        Current Outpatient Medications:   •  amoxicillin (AMOXIL) 500 MG capsule, TAKE ALL 4 PILLS AT ONCE, 1 HOUR PRIOR TO PROCEDURE, Disp: 4 capsule, Rfl: 0  •  atorvastatin (LIPITOR) 10 MG tablet, Take 1  tablet by mouth Every Night., Disp: 90 tablet, Rfl: 3  •  cetirizine (ZyrTEC) 10 MG tablet, Take 10 mg by mouth daily., Disp: , Rfl:   •  cholecalciferol (VITAMIN D3) 1000 UNITS tablet, Take 1,000 Units by mouth Daily., Disp: , Rfl:   •  levothyroxine (Synthroid) 88 MCG tablet, Take 1 tablet by mouth Daily., Disp: 90 tablet, Rfl: 3  •  metoprolol tartrate (LOPRESSOR) 25 MG tablet, TAKE 1 TABLET TWICE A DAY, Disp: 180 tablet, Rfl: 1  •  mometasone (ELOCON) 0.1 % cream, , Disp: , Rfl:   •  montelukast (SINGULAIR) 10 MG tablet, Take 1 tablet by mouth daily., Disp: , Rfl:   •  Multiple Vitamin (MULTIVITAMIN) capsule, Take 1 capsule by mouth daily., Disp: , Rfl:      Objective     History of Present Illness     Review of Systems   Constitutional: Negative.    HENT: Negative.    Respiratory: Negative.    Cardiovascular: Negative.    Gastrointestinal: Negative.    Genitourinary: Negative.    Musculoskeletal: Negative.    Skin: Negative.    Neurological: Negative.    Psychiatric/Behavioral: Negative.        Physical Exam   Constitutional: He is oriented to person, place, and time. He appears well-developed and well-nourished.   Pleasant, cooperative no acute distress blood pressure 130/80   HENT:   Head: Normocephalic and atraumatic.   Eyes: Conjunctivae are normal. No scleral icterus.   Neck: Normal range of motion. Neck supple. No thyromegaly present.   Cardiovascular: Normal rate and regular rhythm.   Murmur heard.  1/6 to 2/6 systolic murmur at the upper sternal border   Pulmonary/Chest: Effort normal and breath sounds normal. No respiratory distress. He has no wheezes. He has no rales.   Musculoskeletal: Normal range of motion. He exhibits no edema.   Neurological: He is alert and oriented to person, place, and time.   Skin: Skin is warm and dry.   Psychiatric: He has a normal mood and affect. His behavior is normal.   Nursing note and vitals reviewed.      ASSESSMENT CBC was normal.  Urinalysis was clear.  CMP was  also completely normal and lipid panel is generally controlled with total cholesterol of 122, HDL of 30 and LDL 64.  TSH and free T4 are quite normal.  #1-pre-hypertension, stable  #2-hyperlipidemia, controlled on medication  #3-hypothyroidism, euthyroid on replacement  #4-history of aortic valve replacement with very minimal murmur  #5-history of a sending aortic aneurysm repair  #6-environmental and seasonal allergies, stable on medications     Problem List Items Addressed This Visit        Cardiovascular and Mediastinum    Other hyperlipidemia    Relevant Medications    atorvastatin (LIPITOR) 10 MG tablet    Pre-hypertension    Valvular heart disease - Primary       Endocrine    Acquired hypothyroidism    Relevant Medications    levothyroxine (Synthroid) 88 MCG tablet       Other    S/P AVR (aortic valve replacement)          PLAN I refilled medications for the patient and I will plan on rechecking him in 6 months with a CBC, CMP, lipid panel, TSH and free T4    There are no Patient Instructions on file for this visit.  Return in about 6 months (around 1/15/2021) for with labs.

## 2020-11-06 DIAGNOSIS — Z95.2 S/P AVR (AORTIC VALVE REPLACEMENT): Primary | ICD-10-CM

## 2020-12-01 ENCOUNTER — HOSPITAL ENCOUNTER (OUTPATIENT)
Dept: CARDIOLOGY | Facility: HOSPITAL | Age: 45
Discharge: HOME | End: 2020-12-01
Admitting: INTERNAL MEDICINE

## 2020-12-01 ENCOUNTER — OFFICE VISIT (OUTPATIENT)
Dept: CARDIOLOGY | Facility: CLINIC | Age: 45
End: 2020-12-01

## 2020-12-01 VITALS
SYSTOLIC BLOOD PRESSURE: 138 MMHG | DIASTOLIC BLOOD PRESSURE: 90 MMHG | BODY MASS INDEX: 30.66 KG/M2 | WEIGHT: 265 LBS | HEIGHT: 78 IN | HEART RATE: 61 BPM

## 2020-12-01 VITALS
OXYGEN SATURATION: 98 % | HEIGHT: 78 IN | SYSTOLIC BLOOD PRESSURE: 118 MMHG | HEART RATE: 60 BPM | DIASTOLIC BLOOD PRESSURE: 82 MMHG | BODY MASS INDEX: 30.66 KG/M2 | WEIGHT: 265 LBS

## 2020-12-01 DIAGNOSIS — Z95.2 S/P AVR (AORTIC VALVE REPLACEMENT): Primary | ICD-10-CM

## 2020-12-01 DIAGNOSIS — Z95.2 S/P AVR (AORTIC VALVE REPLACEMENT): ICD-10-CM

## 2020-12-01 DIAGNOSIS — I77.810 DILATED AORTIC ROOT (HCC): ICD-10-CM

## 2020-12-01 LAB
AORTIC DIMENSIONLESS INDEX: 0.4 (DI)
BH CV ECHO MEAS - ACS: 2.1 CM
BH CV ECHO MEAS - AO ARCH DIAM (PROXIMAL TRANS.): 2.8 CM
BH CV ECHO MEAS - AO MAX PG (FULL): 15.7 MMHG
BH CV ECHO MEAS - AO MAX PG: 18.9 MMHG
BH CV ECHO MEAS - AO MEAN PG (FULL): 9.3 MMHG
BH CV ECHO MEAS - AO MEAN PG: 11.1 MMHG
BH CV ECHO MEAS - AO ROOT AREA (BSA CORRECTED): 1.8
BH CV ECHO MEAS - AO ROOT AREA: 16.7 CM^2
BH CV ECHO MEAS - AO ROOT DIAM: 4.6 CM
BH CV ECHO MEAS - AO V2 MAX: 217.2 CM/SEC
BH CV ECHO MEAS - AO V2 MEAN: 161.1 CM/SEC
BH CV ECHO MEAS - AO V2 VTI: 46.4 CM
BH CV ECHO MEAS - AVA(I,A): 1.2 CM^2
BH CV ECHO MEAS - AVA(I,D): 1.2 CM^2
BH CV ECHO MEAS - AVA(V,A): 1.3 CM^2
BH CV ECHO MEAS - AVA(V,D): 1.3 CM^2
BH CV ECHO MEAS - BSA(HAYCOCK): 2.6 M^2
BH CV ECHO MEAS - BSA: 2.5 M^2
BH CV ECHO MEAS - BZI_BMI: 30.6 KILOGRAMS/M^2
BH CV ECHO MEAS - BZI_METRIC_HEIGHT: 198.1 CM
BH CV ECHO MEAS - BZI_METRIC_WEIGHT: 120.2 KG
BH CV ECHO MEAS - EDV(MOD-SP2): 123 ML
BH CV ECHO MEAS - EDV(MOD-SP4): 164 ML
BH CV ECHO MEAS - EDV(TEICH): 84.8 ML
BH CV ECHO MEAS - EF(CUBED): 73.5 %
BH CV ECHO MEAS - EF(MOD-BP): 55.1 %
BH CV ECHO MEAS - EF(MOD-SP2): 57.7 %
BH CV ECHO MEAS - EF(MOD-SP4): 55.5 %
BH CV ECHO MEAS - EF(TEICH): 65.6 %
BH CV ECHO MEAS - ESV(MOD-SP2): 52 ML
BH CV ECHO MEAS - ESV(MOD-SP4): 73 ML
BH CV ECHO MEAS - ESV(TEICH): 29.2 ML
BH CV ECHO MEAS - FS: 35.8 %
BH CV ECHO MEAS - IVS/LVPW: 1
BH CV ECHO MEAS - IVSD: 1.4 CM
BH CV ECHO MEAS - LAT PEAK E' VEL: 7.8 CM/SEC
BH CV ECHO MEAS - LV DIASTOLIC VOL/BSA (35-75): 64.5 ML/M^2
BH CV ECHO MEAS - LV MASS(C)D: 223.9 GRAMS
BH CV ECHO MEAS - LV MASS(C)DI: 88 GRAMS/M^2
BH CV ECHO MEAS - LV MAX PG: 3.2 MMHG
BH CV ECHO MEAS - LV MEAN PG: 1.8 MMHG
BH CV ECHO MEAS - LV SYSTOLIC VOL/BSA (12-30): 28.7 ML/M^2
BH CV ECHO MEAS - LV V1 MAX: 89.5 CM/SEC
BH CV ECHO MEAS - LV V1 MEAN: 63.7 CM/SEC
BH CV ECHO MEAS - LV V1 VTI: 17.8 CM
BH CV ECHO MEAS - LVIDD: 4.3 CM
BH CV ECHO MEAS - LVIDS: 2.8 CM
BH CV ECHO MEAS - LVLD AP2: 9 CM
BH CV ECHO MEAS - LVLD AP4: 9.2 CM
BH CV ECHO MEAS - LVLS AP2: 7.6 CM
BH CV ECHO MEAS - LVLS AP4: 8.4 CM
BH CV ECHO MEAS - LVOT AREA (M): 3.1 CM^2
BH CV ECHO MEAS - LVOT AREA: 3.2 CM^2
BH CV ECHO MEAS - LVOT DIAM: 2 CM
BH CV ECHO MEAS - LVPWD: 1.3 CM
BH CV ECHO MEAS - MED PEAK E' VEL: 9.6 CM/SEC
BH CV ECHO MEAS - MV A MAX VEL: 56.1 CM/SEC
BH CV ECHO MEAS - MV DEC SLOPE: 164.2 CM/SEC^2
BH CV ECHO MEAS - MV DEC TIME: 0.15 SEC
BH CV ECHO MEAS - MV E MAX VEL: 62.1 CM/SEC
BH CV ECHO MEAS - MV E/A: 1.1
BH CV ECHO MEAS - MV MAX PG: 1.8 MMHG
BH CV ECHO MEAS - MV MEAN PG: 0.72 MMHG
BH CV ECHO MEAS - MV P1/2T MAX VEL: 36.1 CM/SEC
BH CV ECHO MEAS - MV P1/2T: 64.3 MSEC
BH CV ECHO MEAS - MV V2 MAX: 66.4 CM/SEC
BH CV ECHO MEAS - MV V2 MEAN: 39.9 CM/SEC
BH CV ECHO MEAS - MV V2 VTI: 22.3 CM
BH CV ECHO MEAS - MVA P1/2T LCG: 6.1 CM^2
BH CV ECHO MEAS - MVA(P1/2T): 3.4 CM^2
BH CV ECHO MEAS - MVA(VTI): 2.6 CM^2
BH CV ECHO MEAS - PA ACC TIME: 0.14 SEC
BH CV ECHO MEAS - PA MAX PG (FULL): 3.2 MMHG
BH CV ECHO MEAS - PA MAX PG: 5 MMHG
BH CV ECHO MEAS - PA PR(ACCEL): 17.2 MMHG
BH CV ECHO MEAS - PA V2 MAX: 111.3 CM/SEC
BH CV ECHO MEAS - PULM A REVS DUR: 0.09 SEC
BH CV ECHO MEAS - PULM A REVS VEL: 36.8 CM/SEC
BH CV ECHO MEAS - PULM DIAS VEL: 31.5 CM/SEC
BH CV ECHO MEAS - PULM S/D: 1.6
BH CV ECHO MEAS - PULM SYS VEL: 50.1 CM/SEC
BH CV ECHO MEAS - PVA(V,A): 1.8 CM^2
BH CV ECHO MEAS - PVA(V,D): 1.8 CM^2
BH CV ECHO MEAS - QP/QS: 0.81
BH CV ECHO MEAS - RAP SYSTOLE: 3 MMHG
BH CV ECHO MEAS - RV MAX PG: 1.7 MMHG
BH CV ECHO MEAS - RV MEAN PG: 0.86 MMHG
BH CV ECHO MEAS - RV V1 MAX: 65.6 CM/SEC
BH CV ECHO MEAS - RV V1 MEAN: 42.8 CM/SEC
BH CV ECHO MEAS - RV V1 VTI: 14.9 CM
BH CV ECHO MEAS - RVOT AREA: 3.1 CM^2
BH CV ECHO MEAS - RVOT DIAM: 2 CM
BH CV ECHO MEAS - RVSP: 12 MMHG
BH CV ECHO MEAS - SI(AO): 305.1 ML/M^2
BH CV ECHO MEAS - SI(CUBED): 23.6 ML/M^2
BH CV ECHO MEAS - SI(LVOT): 22.7 ML/M^2
BH CV ECHO MEAS - SI(MOD-SP2): 27.9 ML/M^2
BH CV ECHO MEAS - SI(MOD-SP4): 35.8 ML/M^2
BH CV ECHO MEAS - SI(TEICH): 21.8 ML/M^2
BH CV ECHO MEAS - SV(AO): 776.4 ML
BH CV ECHO MEAS - SV(CUBED): 60 ML
BH CV ECHO MEAS - SV(LVOT): 57.8 ML
BH CV ECHO MEAS - SV(MOD-SP2): 71 ML
BH CV ECHO MEAS - SV(MOD-SP4): 91 ML
BH CV ECHO MEAS - SV(RVOT): 46.6 ML
BH CV ECHO MEAS - SV(TEICH): 55.6 ML
BH CV ECHO MEAS - TAPSE (>1.6): 2 CM
BH CV ECHO MEAS - TR MAX VEL: 152.6 CM/SEC
BH CV ECHO MEASUREMENTS AVERAGE E/E' RATIO: 7.14
BH CV XLRA - RV BASE: 3.9 CM
BH CV XLRA - RV LENGTH: 7.2 CM
BH CV XLRA - RV MID: 2.8 CM
BH CV XLRA - TDI S': 15.2 CM/SEC
LEFT ATRIUM VOLUME INDEX: 33 ML/M2
MAXIMAL PREDICTED HEART RATE: 174 BPM
SINUS: 4.7 CM
STJ: 3.4 CM
STRESS TARGET HR: 148 BPM

## 2020-12-01 PROCEDURE — 93356 MYOCRD STRAIN IMG SPCKL TRCK: CPT | Performed by: INTERNAL MEDICINE

## 2020-12-01 PROCEDURE — 99214 OFFICE O/P EST MOD 30 MIN: CPT | Performed by: INTERNAL MEDICINE

## 2020-12-01 PROCEDURE — 93306 TTE W/DOPPLER COMPLETE: CPT | Performed by: INTERNAL MEDICINE

## 2020-12-01 PROCEDURE — 93306 TTE W/DOPPLER COMPLETE: CPT

## 2020-12-01 PROCEDURE — 93356 MYOCRD STRAIN IMG SPCKL TRCK: CPT

## 2020-12-01 PROCEDURE — 93000 ELECTROCARDIOGRAM COMPLETE: CPT | Performed by: INTERNAL MEDICINE

## 2020-12-01 NOTE — PROGRESS NOTES
Subjective:     Encounter Date:12/01/2020      Patient ID: Maged Christopher is a 45 y.o. male.    Chief Complaint: Aortic valve replacement and proximal aortic root reconstruction.  History of Present Illness    45-year-old gentleman who presents today for reevaluation.  Clinically he is doing well.  He denies chest pain shortness of breath lightheadedness swelling fatigue.  Says he occasionally gets a flutter but last very briefly.  Patient had an echocardiogram today which showed that his valve is functioning fine but his aortic root looked larger than it has in the past.    Review of Systems   All other systems reviewed and are negative.        ECG 12 Lead    Date/Time: 12/1/2020 4:14 PM  Performed by: Fidel Norton MD  Authorized by: iFdel Norton MD   Comparison: compared with previous ECG from 12/12/2019  Similar to previous ECG  Rhythm: sinus rhythm  Other findings: poor R wave progression    Clinical impression: abnormal EKG               Objective:     Vitals signs reviewed.   Constitutional:       Appearance: Well-developed.   Eyes:      Conjunctiva/sclera: Conjunctivae normal.   HENT:      Head: Normocephalic.   Neck:      Musculoskeletal: Normal range of motion.   Pulmonary:      Breath sounds: Normal breath sounds.   Cardiovascular:      Normal rate. Regular rhythm.   Edema:     Peripheral edema absent.   Abdominal:      General: Bowel sounds are normal.      Palpations: Abdomen is soft.   Musculoskeletal: Normal range of motion.   Skin:     General: Skin is warm and dry.   Neurological:      Mental Status: Alert and oriented to person, place, and time.   Psychiatric:         Behavior: Behavior normal.       Interpretation Summary  12/1/2020    · Calculated left ventricular EF = 55.1% Estimated left ventricular EF was in agreement with the calculated left ventricular EF. Left ventricular systolic function is normal.  · There is a bioprosthetic aortic valve present. Functioning  normally.  · Aortic valve dimensionless index is 0.4 .  · Estimated right ventricular systolic pressure from tricuspid regurgitation is normal (<35 mmHg). Calculated right ventricular systolic pressure from tricuspid regurgitation is 12 mmHg.  · Moderate dilation of the aortic root is present. (4.6 cm)          Lab Review:       Assessment:          Diagnosis Plan   1. S/P AVR (aortic valve replacement)  CT Angiogram Chest With & Without Contrast   2. Dilated aortic root (CMS/HCC)  CT Angiogram Chest With & Without Contrast          Plan:         1.  Status post AVR.  Clinically patient is doing well his echo showed his valve looks good.  2.  His aortic root appeared large on today's echo.  The valve is well-seated but is been 2 years since his last CT scan.  I therefore set him up for a CT scan.  He is followed by Dr. Schwartz.

## 2020-12-23 ENCOUNTER — HOSPITAL ENCOUNTER (OUTPATIENT)
Dept: CT IMAGING | Facility: HOSPITAL | Age: 45
Discharge: HOME | End: 2020-12-23
Admitting: INTERNAL MEDICINE

## 2020-12-23 DIAGNOSIS — I77.810 DILATED AORTIC ROOT (HCC): ICD-10-CM

## 2020-12-23 DIAGNOSIS — Z95.2 S/P AVR (AORTIC VALVE REPLACEMENT): ICD-10-CM

## 2020-12-23 LAB — CREAT BLDA-MCNC: 0.9 MG/DL (ref 0.6–1.3)

## 2020-12-23 PROCEDURE — 0 IOPAMIDOL PER 1 ML: Performed by: INTERNAL MEDICINE

## 2020-12-23 PROCEDURE — 82565 ASSAY OF CREATININE: CPT

## 2020-12-23 PROCEDURE — 71275 CT ANGIOGRAPHY CHEST: CPT

## 2020-12-23 RX ADMIN — IOPAMIDOL 95 ML: 755 INJECTION, SOLUTION INTRAVENOUS at 14:22

## 2020-12-29 ENCOUNTER — TELEPHONE (OUTPATIENT)
Dept: CARDIAC SURGERY | Facility: CLINIC | Age: 45
End: 2020-12-29

## 2021-01-13 ENCOUNTER — OFFICE VISIT (OUTPATIENT)
Dept: CARDIAC SURGERY | Facility: CLINIC | Age: 46
End: 2021-01-13

## 2021-01-13 VITALS
BODY MASS INDEX: 30.7 KG/M2 | DIASTOLIC BLOOD PRESSURE: 77 MMHG | TEMPERATURE: 98 F | SYSTOLIC BLOOD PRESSURE: 114 MMHG | RESPIRATION RATE: 20 BRPM | WEIGHT: 260 LBS | HEIGHT: 77 IN | OXYGEN SATURATION: 99 % | HEART RATE: 56 BPM

## 2021-01-13 DIAGNOSIS — Z98.890 STATUS POST ASCENDING AORTIC ANEURYSM REPAIR: Primary | ICD-10-CM

## 2021-01-13 DIAGNOSIS — I71.20 THORACIC AORTIC ANEURYSM WITHOUT RUPTURE (HCC): ICD-10-CM

## 2021-01-13 DIAGNOSIS — Z86.79 STATUS POST ASCENDING AORTIC ANEURYSM REPAIR: Primary | ICD-10-CM

## 2021-01-13 DIAGNOSIS — Z95.2 S/P AVR (AORTIC VALVE REPLACEMENT): ICD-10-CM

## 2021-01-13 PROCEDURE — 99213 OFFICE O/P EST LOW 20 MIN: CPT | Performed by: NURSE PRACTITIONER

## 2021-01-13 NOTE — PROGRESS NOTES
1/13/2021      Subjective:      Tu Elias MD    Chief Complaint: Thoracic aortic aneurysm follow-up    History of Present Illness:       Dear Tu Zuluaga MD and Colleagues,    It was nice to see Maged Christopher in follow up for his thoracic aorta.  He is a 45 y.o. male with a history of root aneurysm and severe bicuspid aortic valve incompetence that underwent ascending aortic replacement with Dacron interposition graft with coronary reconstruction, aortic valve replacement with tissue AVR and aortic root reduction 5/2014 with Dr. Black.  He comes in today for routine follow-up for aneurysm surveillance.  The CT of chest on 12/23/2020 was reviewed with Dr. Black and is consistent with the radiologists reading of aortic root of 4.5 cm.  Upon review of previous CT his grafting is stable, his root measured at 4.2.  An echocardiogram on 12/1/2020 demonstrated a normal functioning prosthetic valve.  The patient states that he had Marfan's testing previously that was negative.  He denies shortness of breath, chest/back pain, numbness/tingling/pain of extremities.  No vascular deficiencies or hyperextensible or hypermobile joints are noted on exam.  The patient's family history is negative for aneurysms or dissections although his grandmother and aunt both had bicuspid valves, negative for connective tissue disease, and positive for coronary disease in first degree family members with his father having had CABG at age 76.           Primary history: Hypothyroidism, hypertension, bicuspid valve     Surgical history: Aortic root repair/AVR/ascending aortic grafting 2014, pleurodesis 1998, sinus surgery 2017     Social history: Denies ever smoking, drugs approximate 1 beer per week, denies illicits, denies herbal use, denies stimulants with the exception of 4 cups of coffee dispersed throughout the day.     Family history: As above      Patient Active Problem List   Diagnosis   • Status post ascending  aortic aneurysm repair   • S/P AVR (aortic valve replacement)   • Anxiety about health   • Acquired hypothyroidism   • Environmental and seasonal allergies   • Other hyperlipidemia   • Obstructive sleep apnea syndrome   • Pre-hypertension   • Valvular heart disease   • Localized skin mass, lump, or swelling       Past Medical History:   Diagnosis Date   • Aneurysm of ascending aorta (CMS/HCC)     S/p AVR   • Aortic regurgitation    • Aortic valve insufficiency    • Bicuspid aortic valve    • Deviated nasal septum    • Hypothyroidism    • Lung collapse    • Nasal polyp 01/2018   • Palpitations    • Valvular heart disease        Past Surgical History:   Procedure Laterality Date   • AORTIC VALVE REPAIR/REPLACEMENT     • OTHER SURGICAL HISTORY      ASC Aortic Aneurysm Graft; Coronary Reconstruction with Root Repalcement   • THORACOSCOPY      (Therapeutic) with Pleurodesis       No Known Allergies      Current Outpatient Medications:   •  atorvastatin (LIPITOR) 10 MG tablet, Take 1 tablet by mouth Every Night., Disp: 90 tablet, Rfl: 3  •  cetirizine (ZyrTEC) 10 MG tablet, Take 10 mg by mouth daily., Disp: , Rfl:   •  cholecalciferol (VITAMIN D3) 1000 UNITS tablet, Take 1,000 Units by mouth Daily., Disp: , Rfl:   •  levothyroxine (Synthroid) 88 MCG tablet, Take 1 tablet by mouth Daily., Disp: 90 tablet, Rfl: 3  •  metoprolol tartrate (LOPRESSOR) 25 MG tablet, TAKE 1 TABLET TWICE A DAY, Disp: 180 tablet, Rfl: 3  •  montelukast (SINGULAIR) 10 MG tablet, Take 1 tablet by mouth daily., Disp: , Rfl:   •  Multiple Vitamin (MULTIVITAMIN) capsule, Take 1 capsule by mouth daily., Disp: , Rfl:   •  amoxicillin (AMOXIL) 500 MG capsule, TAKE ALL 4 PILLS AT ONCE, 1 HOUR PRIOR TO PROCEDURE, Disp: 4 capsule, Rfl: 0  •  mometasone (ELOCON) 0.1 % cream, , Disp: , Rfl:     Social History     Socioeconomic History   • Marital status:      Spouse name: Not on file   • Number of children: Not on file   • Years of education: Not on  file   • Highest education level: Not on file   Tobacco Use   • Smoking status: Former Smoker     Types: Cigarettes   • Smokeless tobacco: Never Used   Substance and Sexual Activity   • Alcohol use: Yes     Comment: Rare; Daily Caffine Use   • Drug use: No   • Sexual activity: Defer       Family History   Problem Relation Age of Onset   • Hypertension Paternal Grandfather    • Valvular heart disease Other        The following portions of the patient's history were reviewed and updated as appropriate: allergies, current medications, past family history, past medical history, past social history, past surgical history and problem list.    Review of Systems:  Review of Systems   Constitutional: Negative for chills, diaphoresis and fatigue.   Respiratory: Negative for apnea, chest tightness, shortness of breath and wheezing.    Cardiovascular: Negative for chest pain, palpitations and leg swelling.   Skin: Negative for pallor.   Neurological: Negative for dizziness, seizures, syncope, weakness and light-headedness.   All other systems reviewed and are negative.      Physical Exam:    Vital Signs:  Weight: 118 kg (260 lb)   Body mass index is 30.83 kg/m².  Temp: 98 °F (36.7 °C)   Heart Rate: 56   BP: 114/77     Constitutional:       Appearance: Healthy appearance. Well-developed and well-groomed.   Neck:      Vascular: Normal carotid pulses. No carotid bruit or JVD.   Pulmonary:      Effort: Pulmonary effort is normal.      Breath sounds: Normal breath sounds. No decreased breath sounds. No wheezing. No rhonchi. No rales.   Cardiovascular:      Normal rate. Regular rhythm.      Murmurs: There is a systolic murmur. Prosthetic valve      No gallop. No rub.   Pulses:     Carotid: 2+ bilaterally.     Radial: 2+ bilaterally.     Dorsalis pedis: 2+ bilaterally.  Abdominal:      General: Bowel sounds are normal. There is no distension.      Palpations: Abdomen is soft. There is no abdominal mass.      Tenderness: There is no  abdominal tenderness. There is no guarding.   Skin:     General: Skin is warm and dry.      Coloration: Skin is not pale.      Findings: No erythema or rash.   Neurological:      Mental Status: Alert, oriented to person, place, and time and oriented to person, place and time.   Psychiatric:         Attention and Perception: Attention normal.         Mood and Affect: Mood normal.         Speech: Speech normal.         Behavior: Behavior normal. Behavior is cooperative.         Thought Content: Thought content normal.         Judgment: Judgment normal.          Assessment:      1.  Bicuspid valve status post root repair and tissue AVR-----mild interval enlargement of aortic root to 4.5 without aortic valve insufficiency  2.  Ascending aortic aneurysm status post Dacron graft----stable residual aorta  3.  Hypertension-----well controlled on current treatment, cardiology managing  4.  Hyperlipidemia ------ managed by primary care, not currently on a statin  5.  Hypothyroidism----managed by primary care    Recommendation/Plan:     Repeat CT of the chest without contrast in 1 year prior to office appointment with Dr. Black    We discussed the importance of avoidance of over the counter decongestants and stimulants, specifically pseudoephedrine and caffeine, for hypertension and aneurysm management.  Attempt to decrease caffeine to 2 cups daily.    Risk factor modification of hypertension with a goal blood pressure less than 130/80, hyperlipidemia optimization, and continued avoidance of tobacco/nicotine products.    Initiation of low aerobic exercise is indicated to help reduce body habitus, assist with blood pressure and cholesterol control.       Although risk of rupture is low, emergency department presentation is warranted for acute chest, back, or abdominal pain, syncope, or stroke like symptoms    Thank you for allowing us to participate in his care.    Regards,    KATHLEEN Paz    ** >25 minutes in face to  face conversation regarding treatment plan, education, and answering any questions the patient may have had

## 2021-01-22 ENCOUNTER — OFFICE VISIT (OUTPATIENT)
Dept: FAMILY MEDICINE CLINIC | Facility: CLINIC | Age: 46
End: 2021-01-22

## 2021-01-22 VITALS
DIASTOLIC BLOOD PRESSURE: 78 MMHG | RESPIRATION RATE: 16 BRPM | TEMPERATURE: 96.6 F | SYSTOLIC BLOOD PRESSURE: 117 MMHG | BODY MASS INDEX: 31.95 KG/M2 | OXYGEN SATURATION: 99 % | HEIGHT: 77 IN | WEIGHT: 270.6 LBS | HEART RATE: 58 BPM

## 2021-01-22 DIAGNOSIS — Z80.42 FAMILY HISTORY OF PROSTATE CANCER IN FATHER: ICD-10-CM

## 2021-01-22 DIAGNOSIS — Z98.890 STATUS POST ASCENDING AORTIC ANEURYSM REPAIR: ICD-10-CM

## 2021-01-22 DIAGNOSIS — E78.49 OTHER HYPERLIPIDEMIA: ICD-10-CM

## 2021-01-22 DIAGNOSIS — E03.9 ACQUIRED HYPOTHYROIDISM: ICD-10-CM

## 2021-01-22 DIAGNOSIS — Z95.2 S/P AVR (AORTIC VALVE REPLACEMENT): Primary | ICD-10-CM

## 2021-01-22 DIAGNOSIS — R03.0 PRE-HYPERTENSION: ICD-10-CM

## 2021-01-22 DIAGNOSIS — Z86.79 STATUS POST ASCENDING AORTIC ANEURYSM REPAIR: ICD-10-CM

## 2021-01-22 PROCEDURE — 99214 OFFICE O/P EST MOD 30 MIN: CPT | Performed by: INTERNAL MEDICINE

## 2021-01-22 NOTE — PROGRESS NOTES
Subjective   Maged Christopher is a 45 y.o. male. Patient is here today for follow-up on his prehypertension, hyperlipidemia, history of valvular heart disease status post aortic valve replacement and ascending aortic aneurysm repair, hypothyroidism.  He generally feels well.  He does see cardiology regularly.  He tells me that his father had prostate cancer in his early 50s and his grandfather also had it in his 60s.  He has had no urinary symptoms.  Chief Complaint   Patient presents with   • Hyperlipidemia     Lab f/u   • Hypothyroidism   • Prehypertension          Vitals:    01/22/21 0916   BP: 117/78   Pulse: 58   Resp: 16   Temp: 96.6 °F (35.9 °C)   SpO2: 99%     Body mass index is 32.09 kg/m².  The following portions of the patient's history were reviewed and updated as appropriate: allergies, current medications, past family history, past medical history, past social history, past surgical history and problem list.    Past Medical History:   Diagnosis Date   • Aneurysm of ascending aorta (CMS/HCC)     S/p AVR   • Aortic regurgitation    • Aortic valve insufficiency    • Bicuspid aortic valve    • Deviated nasal septum    • Hypothyroidism    • Lung collapse    • Nasal polyp 01/2018   • Palpitations    • Valvular heart disease       No Known Allergies   Social History     Socioeconomic History   • Marital status:      Spouse name: Not on file   • Number of children: Not on file   • Years of education: Not on file   • Highest education level: Not on file   Tobacco Use   • Smoking status: Former Smoker     Types: Cigarettes   • Smokeless tobacco: Never Used   Substance and Sexual Activity   • Alcohol use: Yes     Comment: Rare; Daily Caffine Use   • Drug use: No   • Sexual activity: Defer        Current Outpatient Medications:   •  amoxicillin (AMOXIL) 500 MG capsule, TAKE ALL 4 PILLS AT ONCE, 1 HOUR PRIOR TO PROCEDURE, Disp: 4 capsule, Rfl: 0  •  atorvastatin (LIPITOR) 10 MG tablet, Take 1 tablet by mouth  Every Night., Disp: 90 tablet, Rfl: 3  •  cetirizine (ZyrTEC) 10 MG tablet, Take 10 mg by mouth daily., Disp: , Rfl:   •  cholecalciferol (VITAMIN D3) 1000 UNITS tablet, Take 1,000 Units by mouth Daily., Disp: , Rfl:   •  levothyroxine (Synthroid) 88 MCG tablet, Take 1 tablet by mouth Daily., Disp: 90 tablet, Rfl: 3  •  metoprolol tartrate (LOPRESSOR) 25 MG tablet, TAKE 1 TABLET TWICE A DAY, Disp: 180 tablet, Rfl: 3  •  mometasone (ELOCON) 0.1 % cream, , Disp: , Rfl:   •  montelukast (SINGULAIR) 10 MG tablet, Take 1 tablet by mouth daily., Disp: , Rfl:   •  Multiple Vitamin (MULTIVITAMIN) capsule, Take 1 capsule by mouth daily., Disp: , Rfl:      Objective     History of Present Illness     Review of Systems   Constitutional: Negative.    HENT: Negative.    Respiratory: Negative.    Cardiovascular: Negative.    Gastrointestinal: Negative.    Endocrine: Negative.    Genitourinary: Negative.    Musculoskeletal: Negative.    Skin: Negative.    Allergic/Immunologic: Positive for environmental allergies.   Neurological: Negative.    Hematological: Negative.    Psychiatric/Behavioral: Negative.        Physical Exam  Vitals signs (100/70) and nursing note reviewed.   Constitutional:       General: He is not in acute distress.     Appearance: Normal appearance. He is not ill-appearing.   HENT:      Head: Normocephalic and atraumatic.   Eyes:      General: No scleral icterus.     Conjunctiva/sclera: Conjunctivae normal.   Neck:      Musculoskeletal: Normal range of motion and neck supple.   Cardiovascular:      Rate and Rhythm: Normal rate and regular rhythm.      Heart sounds: Murmur present.      Comments: Is a 2/6 systolic murmur in the sternal border  Pulmonary:      Effort: Pulmonary effort is normal. No respiratory distress.      Breath sounds: Normal breath sounds. No wheezing or rales.   Musculoskeletal: Normal range of motion.   Skin:     General: Skin is warm and dry.   Neurological:      General: No focal deficit  present.      Mental Status: He is alert and oriented to person, place, and time.   Psychiatric:         Mood and Affect: Mood normal.         Behavior: Behavior normal.         ASSESSMENT CBC is completely normal.  CMP is completely normal.  Lipid panel stable with total cholesterol 134, HDL of 34 and LDL of 80.  TSH and free T4 both normal on supplement  #1-prehypertension with normal pressure today  #2-hyperlipidemia, stable and controlled on medication  #3-status post aortic valve replacement and repair of ascending aorta aneurysm  #4-family history of prostate cancer in his father noted early age  #5-hypothyroidism, euthyroid on replacement     Problems Addressed this Visit        Cardiac and Vasculature    Status post ascending aortic aneurysm repair    S/P AVR (aortic valve replacement) - Primary    Other hyperlipidemia    Pre-hypertension       Endocrine and Metabolic    Acquired hypothyroidism      Diagnoses       Codes Comments    S/P AVR (aortic valve replacement)    -  Primary ICD-10-CM: Z95.2  ICD-9-CM: V43.3     Status post ascending aortic aneurysm repair     ICD-10-CM: Z98.890, Z86.79  ICD-9-CM: V45.89     Pre-hypertension     ICD-10-CM: R03.0  ICD-9-CM: 796.2     Other hyperlipidemia     ICD-10-CM: E78.49  ICD-9-CM: 272.4     Acquired hypothyroidism     ICD-10-CM: E03.9  ICD-9-CM: 244.9           PLAN the patient will continue current medicines as now and I would like to recheck him in 6 months with a CBC, CMP, lipid panel, TSH and free T4, PSA because of family history of prostate cancer and hepatitis C screen per protocol    There are no Patient Instructions on file for this visit.  Return in about 6 months (around 7/22/2021) for with labs.

## 2021-03-18 ENCOUNTER — TELEPHONE (OUTPATIENT)
Dept: CARDIOLOGY | Facility: CLINIC | Age: 46
End: 2021-03-18

## 2021-03-18 NOTE — TELEPHONE ENCOUNTER
----- Message from Fidel Norton MD sent at 3/18/2021 11:54 AM EDT -----  Regarding: FW: Referral Request  Contact: [phone]  Do we have a standard note?  If not have jim make one  ----- Message -----  From: Jennifer Gallagher MA  Sent: 3/18/2021  11:28 AM EDT  To: Fidel Norton MD  Subject: FW: Referral Request                               ----- Message -----  From: Maged Christopher  Sent: 3/17/2021   6:46 PM EDT  To: [facility]  Subject: Referral Request                                 Dr Norton, with phase 1C opening, I have been looking for an open appointment slot.  However the websites are asking for [facility] medical to provide a note confirming 1C that I have a qualifying condition.   I would imagine that there is a standard note that is going out from the [facility].  Could I get one via email so when i go to the vaccine I have the form.    Thank you  Maged Christopher  [phone]  [phone]

## 2021-03-19 NOTE — TELEPHONE ENCOUNTER
I added a smartphrase to Epic and will share with all MAs.  Letter has been created for this patient and sent to him via [employer].  [place]

## 2021-04-06 ENCOUNTER — BULK ORDERING (OUTPATIENT)
Dept: CASE MANAGEMENT | Facility: OTHER | Age: 46
End: 2021-04-06

## 2021-04-06 DIAGNOSIS — Z23 IMMUNIZATION DUE: ICD-10-CM

## 2021-07-22 DIAGNOSIS — Z11.59 NEED FOR HEPATITIS C SCREENING TEST: ICD-10-CM

## 2021-07-22 DIAGNOSIS — E78.49 OTHER HYPERLIPIDEMIA: ICD-10-CM

## 2021-07-22 DIAGNOSIS — Z80.42 FAMILY HISTORY OF PROSTATE CANCER IN FATHER: ICD-10-CM

## 2021-07-22 DIAGNOSIS — E03.9 ACQUIRED HYPOTHYROIDISM: ICD-10-CM

## 2021-07-27 RX ORDER — ATORVASTATIN CALCIUM 10 MG/1
TABLET, FILM COATED ORAL
Qty: 90 TABLET | Refills: 3 | Status: SHIPPED | OUTPATIENT
Start: 2021-07-27 | End: 2022-02-17 | Stop reason: SDUPTHER

## 2021-07-27 RX ORDER — LEVOTHYROXINE SODIUM 88 MCG
TABLET ORAL
Qty: 90 TABLET | Refills: 3 | Status: SHIPPED | OUTPATIENT
Start: 2021-07-27 | End: 2022-02-17 | Stop reason: SDUPTHER

## 2021-08-05 ENCOUNTER — OFFICE VISIT (OUTPATIENT)
Dept: FAMILY MEDICINE CLINIC | Facility: CLINIC | Age: 46
End: 2021-08-05

## 2021-08-05 VITALS
OXYGEN SATURATION: 96 % | HEIGHT: 77 IN | BODY MASS INDEX: 32.59 KG/M2 | RESPIRATION RATE: 18 BRPM | TEMPERATURE: 97.1 F | DIASTOLIC BLOOD PRESSURE: 80 MMHG | WEIGHT: 276 LBS | SYSTOLIC BLOOD PRESSURE: 120 MMHG | HEART RATE: 65 BPM

## 2021-08-05 DIAGNOSIS — E03.9 ACQUIRED HYPOTHYROIDISM: ICD-10-CM

## 2021-08-05 DIAGNOSIS — Z95.2 S/P AVR (AORTIC VALVE REPLACEMENT): ICD-10-CM

## 2021-08-05 DIAGNOSIS — E78.49 OTHER HYPERLIPIDEMIA: ICD-10-CM

## 2021-08-05 DIAGNOSIS — Z86.79 STATUS POST ASCENDING AORTIC ANEURYSM REPAIR: ICD-10-CM

## 2021-08-05 DIAGNOSIS — Z12.11 ENCOUNTER FOR SCREENING FOR MALIGNANT NEOPLASM OF COLON: ICD-10-CM

## 2021-08-05 DIAGNOSIS — Z98.890 STATUS POST ASCENDING AORTIC ANEURYSM REPAIR: ICD-10-CM

## 2021-08-05 DIAGNOSIS — J30.89 ENVIRONMENTAL AND SEASONAL ALLERGIES: Primary | ICD-10-CM

## 2021-08-05 PROCEDURE — 99214 OFFICE O/P EST MOD 30 MIN: CPT | Performed by: INTERNAL MEDICINE

## 2021-08-05 NOTE — PROGRESS NOTES
Subjective   Maged Christopher is a 46 y.o. male. Patient is here today for follow-up on his environmental and seasonal allergies, history of aortic valve replacement and ascending aortic aneurysm repair, hyperlipidemia and hypothyroidism.  He is generally feeling well and has no acute complaints and has had no chest pain, shortness of breath, edema or myalgias  Chief Complaint   Patient presents with   • Hyperlipidemia     HYPOTHYROIDISM- F/U LABS          Vitals:    08/05/21 0858   BP: 120/80   Pulse: 65   Resp: 18   Temp: 97.1 °F (36.2 °C)   SpO2: 96%     Body mass index is 32.72 kg/m².  The following portions of the patient's history were reviewed and updated as appropriate: allergies, current medications, past family history, past medical history, past social history, past surgical history and problem list.    Past Medical History:   Diagnosis Date   • Aneurysm of ascending aorta (CMS/HCC)     S/p AVR   • Aortic regurgitation    • Aortic valve insufficiency    • Bicuspid aortic valve    • Deviated nasal septum    • Hypothyroidism    • Lung collapse    • Nasal polyp 01/2018   • Palpitations    • Valvular heart disease       No Known Allergies   Social History     Socioeconomic History   • Marital status:      Spouse name: Not on file   • Number of children: Not on file   • Years of education: Not on file   • Highest education level: Not on file   Tobacco Use   • Smoking status: Former Smoker     Types: Cigarettes   • Smokeless tobacco: Never Used   Substance and Sexual Activity   • Alcohol use: Yes     Comment: Rare; Daily Caffine Use   • Drug use: No   • Sexual activity: Defer        Current Outpatient Medications:   •  atorvastatin (LIPITOR) 10 MG tablet, TAKE 1 TABLET EVERY NIGHT, Disp: 90 tablet, Rfl: 3  •  cetirizine (ZyrTEC) 10 MG tablet, Take 10 mg by mouth daily., Disp: , Rfl:   •  cholecalciferol (VITAMIN D3) 1000 UNITS tablet, Take 1,000 Units by mouth Daily., Disp: , Rfl:   •  metoprolol tartrate  (LOPRESSOR) 25 MG tablet, TAKE 1 TABLET TWICE A DAY, Disp: 180 tablet, Rfl: 3  •  mometasone (ELOCON) 0.1 % cream, , Disp: , Rfl:   •  montelukast (SINGULAIR) 10 MG tablet, Take 1 tablet by mouth daily., Disp: , Rfl:   •  Multiple Vitamin (MULTIVITAMIN) capsule, Take 1 capsule by mouth daily., Disp: , Rfl:   •  Synthroid 88 MCG tablet, TAKE 1 TABLET DAILY, Disp: 90 tablet, Rfl: 3     Objective     History of Present Illness     Review of Systems   Constitutional: Negative.    HENT: Negative.    Respiratory: Negative.    Cardiovascular: Negative.    Gastrointestinal: Negative.    Genitourinary: Negative.    Musculoskeletal: Negative.    Skin: Negative.    Allergic/Immunologic: Positive for environmental allergies.   Neurological: Negative.    Hematological: Negative.    Psychiatric/Behavioral: Negative.        Physical Exam  Vitals and nursing note reviewed.   Constitutional:       General: He is not in acute distress.     Appearance: Normal appearance. He is not ill-appearing.   HENT:      Head: Normocephalic and atraumatic.   Eyes:      General: No scleral icterus.     Conjunctiva/sclera: Conjunctivae normal.   Cardiovascular:      Rate and Rhythm: Normal rate and regular rhythm.      Heart sounds: Murmur heard.        Comments: Is a 2/6 to 3/6 systolic murmur heard especially the upper right sternal border  Pulmonary:      Effort: Pulmonary effort is normal. No respiratory distress.      Breath sounds: Normal breath sounds. No wheezing or rales.   Musculoskeletal:         General: Normal range of motion.      Cervical back: Normal range of motion and neck supple.      Right lower leg: No edema.      Left lower leg: No edema.   Skin:     General: Skin is warm and dry.   Neurological:      General: No focal deficit present.      Mental Status: He is alert and oriented to person, place, and time.   Psychiatric:         Mood and Affect: Mood normal.         Behavior: Behavior normal.         ASSESSMENT CBC, CMP, TSH  and free T4, PSA and hepatitis C screen were all normal.  Lipid panel is excellent with total cholesterol 126, slightly low HDL of 33 and LDL 66  #1-environmental and seasonal allergies, stable on medications  #2-history of aortic valve replacement and repair of ascending aortic aneurysm, asymptomatic  #3-hyperlipidemia, stable and controlled on medication  #4-hypothyroidism, euthyroid on replacement     Problems Addressed this Visit        Allergies and Adverse Reactions    Environmental and seasonal allergies - Primary       Cardiac and Vasculature    Status post ascending aortic aneurysm repair    S/P AVR (aortic valve replacement)    Other hyperlipidemia       Endocrine and Metabolic    Acquired hypothyroidism      Other Visit Diagnoses     Encounter for screening for malignant neoplasm of colon        Relevant Orders    Cologuard - Stool, Per Rectum      Diagnoses       Codes Comments    Environmental and seasonal allergies    -  Primary ICD-10-CM: J30.89  ICD-9-CM: 477.8     S/P AVR (aortic valve replacement)     ICD-10-CM: Z95.2  ICD-9-CM: V43.3     Status post ascending aortic aneurysm repair     ICD-10-CM: Z98.890, Z86.79  ICD-9-CM: V45.89     Other hyperlipidemia     ICD-10-CM: E78.49  ICD-9-CM: 272.4     Acquired hypothyroidism     ICD-10-CM: E03.9  ICD-9-CM: 244.9     Encounter for screening for malignant neoplasm of colon     ICD-10-CM: Z12.11  ICD-9-CM: V76.51           PLAN I have ordered a Cologuard test on the patient.  I recommended a flu shot in October.  He will continue current medicines as now and I plan on rechecking him in 6 months for complete physical exam including a CBC, CMP, lipid panel, TSH and free T4, urinalysis    There are no Patient Instructions on file for this visit.  Return in about 6 months (around 2/5/2022) for Annual physical, with labs.

## 2021-12-02 ENCOUNTER — OFFICE VISIT (OUTPATIENT)
Dept: CARDIOLOGY | Facility: CLINIC | Age: 46
End: 2021-12-02

## 2021-12-02 VITALS
HEART RATE: 63 BPM | DIASTOLIC BLOOD PRESSURE: 80 MMHG | SYSTOLIC BLOOD PRESSURE: 122 MMHG | BODY MASS INDEX: 32.73 KG/M2 | HEIGHT: 77 IN

## 2021-12-02 DIAGNOSIS — Z95.2 S/P AVR (AORTIC VALVE REPLACEMENT): Primary | ICD-10-CM

## 2021-12-02 DIAGNOSIS — Z98.890 STATUS POST ASCENDING AORTIC ANEURYSM REPAIR: ICD-10-CM

## 2021-12-02 DIAGNOSIS — Z86.79 STATUS POST ASCENDING AORTIC ANEURYSM REPAIR: ICD-10-CM

## 2021-12-02 PROCEDURE — 93000 ELECTROCARDIOGRAM COMPLETE: CPT | Performed by: INTERNAL MEDICINE

## 2021-12-02 PROCEDURE — 99213 OFFICE O/P EST LOW 20 MIN: CPT | Performed by: INTERNAL MEDICINE

## 2021-12-02 NOTE — PROGRESS NOTES
"      CARDIOLOGY    Fidel Norton MD    ENCOUNTER DATE:  12/02/2021    Maged Christopher / 46 y.o. / male        CHIEF COMPLAINT / REASON FOR OFFICE VISIT     S/P AVR (aortic valve replacement) (12/01/2020 Follow up)      HISTORY OF PRESENT ILLNESS       HPI  Maged Christopher is a 46 y.o. male who presents today for reevaluation.  Clinically he is doing fine.  No chest pain shortness of breath palpitations lightheadedness swelling or fatigue.  He had a CT scan and an echocardiogram done last year.  Clinically nothing is changed his blood pressure today is excellent.      The following portions of the patient's history were reviewed and updated as appropriate: allergies, current medications, past family history, past medical history, past social history, past surgical history and problem list.      VITAL SIGNS     Visit Vitals  /80 (BP Location: Left arm)   Pulse 63   Ht 195.6 cm (77\")   BMI 32.73 kg/m²         Wt Readings from Last 3 Encounters:   08/05/21 125 kg (276 lb)   01/22/21 123 kg (270 lb 9.6 oz)   01/13/21 118 kg (260 lb)     Body mass index is 32.73 kg/m².      REVIEW OF SYSTEMS   Review of Systems   All other systems reviewed and are negative.          PHYSICAL EXAMINATION     Vitals reviewed.   Constitutional:       Appearance: Healthy appearance.   Pulmonary:      Effort: Pulmonary effort is normal.   Cardiovascular:      Normal rate. Regular rhythm. Normal S1. Normal S2.      Murmurs: There is a grade 2/6 harsh midsystolic murmur at the URSB, radiating to the neck.      No gallop. No click. No rub.   Pulses:     Intact distal pulses.   Edema:     Peripheral edema absent.   Neurological:      Mental Status: Alert.           REVIEWED DATA       ECG 12 Lead    Date/Time: 12/2/2021 9:20 AM  Performed by: Fidel Norton MD  Authorized by: Fidel Norton MD   Comparison: compared with previous ECG from 12/1/2020  Similar to previous ECG  Rhythm: sinus rhythm    Clinical impression: normal " ECG            Cardiac Procedures:  1.     Lipid Panel    Lipid Panel 1/18/21 7/29/21   Total Cholesterol 134 126   Triglycerides 107 158 (A)   HDL Cholesterol 34 (A) 33 (A)   VLDL Cholesterol 20 27   LDL Cholesterol  80 66   LDL/HDL Ratio 2.31 1.86   (A) Abnormal value       Comments are available for some flowsheets but are not being displayed.               ASSESSMENT & PLAN      Diagnosis Plan   1. S/P AVR (aortic valve replacement)     2. Status post ascending aortic aneurysm repair           SUMMARY/DISCUSSION  1. Aortic valve replacement.  Patient echocardiogram done a year ago.  Patient has gelastic systolic murmur unchanged.  2. Dilated aortic root.  CT scan a year ago shows no significant change.  3. Blood pressure remains excellent.  4. Follow-up in 1 year probably repeat echo next year.  He sees Dr. Schwartz in August will defer whether he needs another CT scan to him.        MEDICATIONS         Discharge Medications          Accurate as of December 2, 2021  9:27 AM. If you have any questions, ask your nurse or doctor.            Continue These Medications      Instructions Start Date   atorvastatin 10 MG tablet  Commonly known as: LIPITOR   TAKE 1 TABLET EVERY NIGHT      cetirizine 10 MG tablet  Commonly known as: zyrTEC   10 mg, Oral, Daily      cholecalciferol 25 MCG (1000 UT) tablet  Commonly known as: VITAMIN D3   1,000 Units, Oral, Daily      metoprolol tartrate 25 MG tablet  Commonly known as: LOPRESSOR   TAKE 1 TABLET TWICE A DAY      mometasone 0.1 % cream  Commonly known as: ELOCON   No dose, route, or frequency recorded.      montelukast 10 MG tablet  Commonly known as: SINGULAIR   1 tablet, Oral, Daily      multivitamin capsule   1 capsule, Oral, Daily      Synthroid 88 MCG tablet  Generic drug: levothyroxine   TAKE 1 TABLET DAILY                 **Dragon Disclaimer:   Much of this encounter note is an electronic transcription/translation of spoken language to printed text. The electronic  translation of spoken language may permit erroneous, or at times, nonsensical words or phrases to be inadvertently transcribed. Although I have reviewed the note for such errors, some may still exist.

## 2022-01-26 ENCOUNTER — HOSPITAL ENCOUNTER (OUTPATIENT)
Dept: CT IMAGING | Facility: HOSPITAL | Age: 47
Discharge: HOME | End: 2022-01-26
Admitting: NURSE PRACTITIONER

## 2022-01-26 DIAGNOSIS — I71.20 THORACIC AORTIC ANEURYSM WITHOUT RUPTURE: ICD-10-CM

## 2022-01-26 PROCEDURE — 71250 CT THORAX DX C-: CPT

## 2022-02-04 DIAGNOSIS — E03.9 ACQUIRED HYPOTHYROIDISM: ICD-10-CM

## 2022-02-04 DIAGNOSIS — Z00.00 ROUTINE HEALTH MAINTENANCE: ICD-10-CM

## 2022-02-12 LAB
ALBUMIN SERPL-MCNC: 4.5 G/DL (ref 4–5)
ALBUMIN/GLOB SERPL: 1.6 {RATIO} (ref 1.2–2.2)
ALP SERPL-CCNC: 68 IU/L (ref 44–121)
ALT SERPL-CCNC: 38 IU/L (ref 0–44)
APPEARANCE UR: CLEAR
AST SERPL-CCNC: 30 IU/L (ref 0–40)
BACTERIA #/AREA URNS HPF: NORMAL /[HPF]
BASOPHILS # BLD AUTO: 0 X10E3/UL (ref 0–0.2)
BASOPHILS NFR BLD AUTO: 1 %
BILIRUB SERPL-MCNC: 0.9 MG/DL (ref 0–1.2)
BILIRUB UR QL STRIP: NEGATIVE
BUN SERPL-MCNC: 18 MG/DL (ref 6–24)
BUN/CREAT SERPL: 18 (ref 9–20)
CALCIUM SERPL-MCNC: 9.5 MG/DL (ref 8.7–10.2)
CASTS URNS QL MICRO: NORMAL /LPF
CHLORIDE SERPL-SCNC: 103 MMOL/L (ref 96–106)
CHOLEST SERPL-MCNC: 154 MG/DL (ref 100–199)
CO2 SERPL-SCNC: 24 MMOL/L (ref 20–29)
COLOR UR: YELLOW
CREAT SERPL-MCNC: 0.98 MG/DL (ref 0.76–1.27)
EOSINOPHIL # BLD AUTO: 0.1 X10E3/UL (ref 0–0.4)
EOSINOPHIL NFR BLD AUTO: 2 %
EPI CELLS #/AREA URNS HPF: NORMAL /HPF (ref 0–10)
ERYTHROCYTE [DISTWIDTH] IN BLOOD BY AUTOMATED COUNT: 12.3 % (ref 11.6–15.4)
GLOBULIN SER CALC-MCNC: 2.9 G/DL (ref 1.5–4.5)
GLUCOSE SERPL-MCNC: 74 MG/DL (ref 65–99)
GLUCOSE UR QL STRIP: NEGATIVE
HCT VFR BLD AUTO: 49.8 % (ref 37.5–51)
HDLC SERPL-MCNC: 37 MG/DL
HGB BLD-MCNC: 16.6 G/DL (ref 13–17.7)
HGB UR QL STRIP: NEGATIVE
IMM GRANULOCYTES # BLD AUTO: 0 X10E3/UL (ref 0–0.1)
IMM GRANULOCYTES NFR BLD AUTO: 0 %
KETONES UR QL STRIP: NEGATIVE
LDLC SERPL CALC-MCNC: 87 MG/DL (ref 0–99)
LDLC/HDLC SERPL: 2.4 RATIO (ref 0–3.6)
LEUKOCYTE ESTERASE UR QL STRIP: NEGATIVE
LYMPHOCYTES # BLD AUTO: 1.6 X10E3/UL (ref 0.7–3.1)
LYMPHOCYTES NFR BLD AUTO: 31 %
MCH RBC QN AUTO: 30.2 PG (ref 26.6–33)
MCHC RBC AUTO-ENTMCNC: 33.3 G/DL (ref 31.5–35.7)
MCV RBC AUTO: 91 FL (ref 79–97)
MICRO URNS: NORMAL
MICRO URNS: NORMAL
MONOCYTES # BLD AUTO: 0.7 X10E3/UL (ref 0.1–0.9)
MONOCYTES NFR BLD AUTO: 12 %
NEUTROPHILS # BLD AUTO: 2.9 X10E3/UL (ref 1.4–7)
NEUTROPHILS NFR BLD AUTO: 54 %
NITRITE UR QL STRIP: NEGATIVE
PH UR STRIP: 6 [PH] (ref 5–7.5)
PLATELET # BLD AUTO: 219 X10E3/UL (ref 150–450)
POTASSIUM SERPL-SCNC: 4.5 MMOL/L (ref 3.5–5.2)
PROT SERPL-MCNC: 7.4 G/DL (ref 6–8.5)
PROT UR QL STRIP: NEGATIVE
RBC # BLD AUTO: 5.5 X10E6/UL (ref 4.14–5.8)
RBC #/AREA URNS HPF: NORMAL /HPF (ref 0–2)
SODIUM SERPL-SCNC: 141 MMOL/L (ref 134–144)
SP GR UR STRIP: 1 (ref 1–1.03)
T4 FREE SERPL-MCNC: 1.28 NG/DL (ref 0.82–1.77)
TRIGL SERPL-MCNC: 171 MG/DL (ref 0–149)
TSH SERPL DL<=0.005 MIU/L-ACNC: 3.13 UIU/ML (ref 0.45–4.5)
UROBILINOGEN UR STRIP-MCNC: 0.2 MG/DL (ref 0.2–1)
VLDLC SERPL CALC-MCNC: 30 MG/DL (ref 5–40)
WBC # BLD AUTO: 5.3 X10E3/UL (ref 3.4–10.8)
WBC #/AREA URNS HPF: NORMAL /HPF (ref 0–5)

## 2022-02-17 ENCOUNTER — OFFICE VISIT (OUTPATIENT)
Dept: FAMILY MEDICINE CLINIC | Facility: CLINIC | Age: 47
End: 2022-02-17

## 2022-02-17 VITALS
RESPIRATION RATE: 18 BRPM | DIASTOLIC BLOOD PRESSURE: 80 MMHG | TEMPERATURE: 96.8 F | SYSTOLIC BLOOD PRESSURE: 116 MMHG | HEIGHT: 77 IN | WEIGHT: 281 LBS | BODY MASS INDEX: 33.18 KG/M2 | OXYGEN SATURATION: 98 % | HEART RATE: 78 BPM

## 2022-02-17 DIAGNOSIS — G47.33 OBSTRUCTIVE SLEEP APNEA SYNDROME: ICD-10-CM

## 2022-02-17 DIAGNOSIS — Z80.42 FAMILY HISTORY OF PROSTATE CANCER IN FATHER: ICD-10-CM

## 2022-02-17 DIAGNOSIS — Z86.79 STATUS POST ASCENDING AORTIC ANEURYSM REPAIR: ICD-10-CM

## 2022-02-17 DIAGNOSIS — Z00.00 HEALTH MAINTENANCE EXAMINATION: Primary | ICD-10-CM

## 2022-02-17 DIAGNOSIS — Z98.890 STATUS POST ASCENDING AORTIC ANEURYSM REPAIR: ICD-10-CM

## 2022-02-17 DIAGNOSIS — J30.89 ENVIRONMENTAL AND SEASONAL ALLERGIES: ICD-10-CM

## 2022-02-17 DIAGNOSIS — Z95.2 S/P AVR (AORTIC VALVE REPLACEMENT): ICD-10-CM

## 2022-02-17 DIAGNOSIS — R03.0 PRE-HYPERTENSION: ICD-10-CM

## 2022-02-17 DIAGNOSIS — E78.49 OTHER HYPERLIPIDEMIA: ICD-10-CM

## 2022-02-17 DIAGNOSIS — E03.9 ACQUIRED HYPOTHYROIDISM: ICD-10-CM

## 2022-02-17 PROCEDURE — 90471 IMMUNIZATION ADMIN: CPT | Performed by: INTERNAL MEDICINE

## 2022-02-17 PROCEDURE — 99396 PREV VISIT EST AGE 40-64: CPT | Performed by: INTERNAL MEDICINE

## 2022-02-17 PROCEDURE — 90732 PPSV23 VACC 2 YRS+ SUBQ/IM: CPT | Performed by: INTERNAL MEDICINE

## 2022-02-17 RX ORDER — MONTELUKAST SODIUM 10 MG/1
10 TABLET ORAL DAILY
Qty: 90 TABLET | Refills: 3 | Status: SHIPPED | OUTPATIENT
Start: 2022-02-17 | End: 2023-01-20

## 2022-02-17 RX ORDER — ATORVASTATIN CALCIUM 10 MG/1
10 TABLET, FILM COATED ORAL NIGHTLY
Qty: 90 TABLET | Refills: 3 | Status: SHIPPED | OUTPATIENT
Start: 2022-02-17 | End: 2023-01-20

## 2022-02-17 RX ORDER — LEVOTHYROXINE SODIUM 88 UG/1
88 TABLET ORAL DAILY
Qty: 90 TABLET | Refills: 3 | Status: SHIPPED | OUTPATIENT
Start: 2022-02-17 | End: 2023-01-20

## 2022-02-17 NOTE — PROGRESS NOTES
Subjective   Maged Christopher is a 46 y.o. male. Patient is here today for a complete physical exam.  He generally feels well and has no acute complaints.  PMH-history of a sending aortic aneurysm repair and aortic valve replacement.  No other operations or hospitalizations he does have hypothyroidism, environmental and seasonal allergies and hyperlipidemia  SH-the patient is , sexually active without problems.  He has regular dental and vision checks.  He had a Cologuard test that was negative.  He walks the dog a mile and 1/2 to 2 miles most days.  He does not use tobacco products and has about 2 beers per week on average.  FH-patient's father has a history of prostate cancer in his 50s.  He is now 80 and with some early dementia.  Mother is 78 and generally healthy    Chief Complaint   Patient presents with   • Annual Exam     PT HERE FOR CPE          Vitals:    02/17/22 1103   BP: 116/80   Pulse: 78   Resp: 18   Temp: 96.8 °F (36 °C)   SpO2: 98%     Body mass index is 33.31 kg/m².    The following portions of the patient's history were reviewed and updated as appropriate: allergies, current medications, past family history, past medical history, past social history, past surgical history and problem list.    Past Medical History:   Diagnosis Date   • Aneurysm of ascending aorta (HCC)     S/p AVR   • Aortic regurgitation    • Aortic valve insufficiency    • Bicuspid aortic valve    • Deviated nasal septum    • Hypothyroidism    • Lung collapse    • Nasal polyp 01/2018   • Palpitations    • Valvular heart disease       No Known Allergies   Social History     Socioeconomic History   • Marital status:    Tobacco Use   • Smoking status: Former Smoker     Types: Cigarettes   • Smokeless tobacco: Never Used   Substance and Sexual Activity   • Alcohol use: Yes     Comment: Rare; Daily Caffine Use   • Drug use: No   • Sexual activity: Defer        Current Outpatient Medications:   •  atorvastatin (LIPITOR) 10  MG tablet, Take 1 tablet by mouth Every Night., Disp: 90 tablet, Rfl: 3  •  cetirizine (ZyrTEC) 10 MG tablet, Take 10 mg by mouth daily., Disp: , Rfl:   •  cholecalciferol (VITAMIN D3) 1000 UNITS tablet, Take 1,000 Units by mouth Daily., Disp: , Rfl:   •  levothyroxine (Synthroid) 88 MCG tablet, Take 1 tablet by mouth Daily., Disp: 90 tablet, Rfl: 3  •  metoprolol tartrate (LOPRESSOR) 25 MG tablet, Take 1 tablet by mouth 2 (Two) Times a Day., Disp: 180 tablet, Rfl: 3  •  mometasone (ELOCON) 0.1 % cream, , Disp: , Rfl:   •  montelukast (SINGULAIR) 10 MG tablet, Take 1 tablet by mouth Daily., Disp: 90 tablet, Rfl: 3  •  Multiple Vitamin (MULTIVITAMIN) capsule, Take 1 capsule by mouth daily., Disp: , Rfl:      EKG  ECG Report    Objective   History of Present Illness   Maged Christopher 46 y.o. male who presents for an Annual Wellness Visit.  he has a history of   Patient Active Problem List   Diagnosis   • Status post ascending aortic aneurysm repair   • S/P AVR (aortic valve replacement)   • Anxiety about health   • Acquired hypothyroidism   • Environmental and seasonal allergies   • Other hyperlipidemia   • Obstructive sleep apnea syndrome   • Pre-hypertension   • Valvular heart disease   • Localized skin mass, lump, or swelling   • Family history of prostate cancer in father   .  he has been doing well with new interval problems.  Labs results discussed in detail with the patient.  Plan to update vaccines if needed today.    Health Habits:  Dental Exam. up to date  Eye Exam. up to date  Exercise: 4 times/week.  Current exercise activities include: walking      Lab Results (most recent)     None            Review of Systems   All other systems reviewed and are negative.      Physical Exam  Vitals and nursing note reviewed.   Constitutional:       General: He is not in acute distress.     Appearance: Normal appearance. He is not ill-appearing.   HENT:      Head: Normocephalic and atraumatic.      Right Ear: Tympanic  membrane, ear canal and external ear normal. There is no impacted cerumen.      Left Ear: Tympanic membrane, ear canal and external ear normal. There is no impacted cerumen.   Eyes:      General: No scleral icterus.        Right eye: No discharge.         Left eye: No discharge.      Extraocular Movements: Extraocular movements intact.      Conjunctiva/sclera: Conjunctivae normal.      Pupils: Pupils are equal, round, and reactive to light.   Neck:      Vascular: No carotid bruit.   Cardiovascular:      Rate and Rhythm: Normal rate and regular rhythm.      Pulses: Normal pulses.      Heart sounds: Murmur heard.   No friction rub. No gallop.       Comments: 1/6 to 2/6 systolic murmur at the mid sternal border  Pulmonary:      Effort: Pulmonary effort is normal. No respiratory distress.      Breath sounds: Normal breath sounds. No stridor. No wheezing, rhonchi or rales.   Chest:      Chest wall: No tenderness.   Abdominal:      General: Abdomen is flat. Bowel sounds are normal. There is no distension.      Palpations: Abdomen is soft. There is no mass.      Tenderness: There is no abdominal tenderness. There is no right CVA tenderness, left CVA tenderness, guarding or rebound.      Hernia: No hernia is present.   Genitourinary:     Penis: Normal.       Testes: Normal.      Comments: No inguinal hernias  Musculoskeletal:         General: Normal range of motion.      Cervical back: Normal range of motion and neck supple. No rigidity or tenderness.      Right lower leg: No edema.      Left lower leg: No edema.   Lymphadenopathy:      Cervical: No cervical adenopathy.   Skin:     General: Skin is warm and dry.   Neurological:      General: No focal deficit present.      Mental Status: He is alert and oriented to person, place, and time.   Psychiatric:         Mood and Affect: Mood normal.         Behavior: Behavior normal.         Thought Content: Thought content normal.         Judgment: Judgment normal.          ASSESSMENT Cologuard test was - August 18, 2021.  CBC was completely normal.  CMP was completely normal.  Lipid panel has total cholesterol 154, HDL 37, LDL 87.  TSH and free T4 were both normal on supplement.  Urinalysis was clear.  #1-status post repair of thoracic aortic aneurysm and aortic valve replacement, clinically stable  #2-prehypertension with controlled blood pressure today  #3-hyperlipidemia, controlled on medication  #4-hypothyroidism, euthyroid on replacement  #5-environmental and seasonal allergies, stable on medications       Problems Addressed this Visit        Allergies and Adverse Reactions    Environmental and seasonal allergies       Cardiac and Vasculature    Status post ascending aortic aneurysm repair    S/P AVR (aortic valve replacement)    Other hyperlipidemia    Relevant Medications    atorvastatin (LIPITOR) 10 MG tablet       Endocrine and Metabolic    Acquired hypothyroidism    Relevant Medications    levothyroxine (Synthroid) 88 MCG tablet    metoprolol tartrate (LOPRESSOR) 25 MG tablet       Family History    Family history of prostate cancer in father       Sleep    Obstructive sleep apnea syndrome      Other Visit Diagnoses     Health maintenance examination    -  Primary      Diagnoses       Codes Comments    Health maintenance examination    -  Primary ICD-10-CM: Z00.00  ICD-9-CM: V70.0     Obstructive sleep apnea syndrome     ICD-10-CM: G47.33  ICD-9-CM: 327.23     Family history of prostate cancer in father     ICD-10-CM: Z80.42  ICD-9-CM: V16.42     Acquired hypothyroidism     ICD-10-CM: E03.9  ICD-9-CM: 244.9     Status post ascending aortic aneurysm repair     ICD-10-CM: Z98.890, Z86.79  ICD-9-CM: V45.89     S/P AVR (aortic valve replacement)     ICD-10-CM: Z95.2  ICD-9-CM: V43.3     Environmental and seasonal allergies     ICD-10-CM: J30.89  ICD-9-CM: 477.8     Other hyperlipidemia     ICD-10-CM: E78.49  ICD-9-CM: 272.4           PLAN I refilled the patient's  medications and he will continue medicines as now but will go on levothyroxine generic rather than Synthroid.  I plan on rechecking him in 6 months with a CBC, CMP, lipid panel, TSH and free T4    There are no Patient Instructions on file for this visit.    No follow-ups on file.

## 2022-03-01 ENCOUNTER — OFFICE VISIT (OUTPATIENT)
Dept: CARDIAC SURGERY | Facility: CLINIC | Age: 47
End: 2022-03-01

## 2022-03-01 VITALS
TEMPERATURE: 98 F | HEART RATE: 56 BPM | DIASTOLIC BLOOD PRESSURE: 87 MMHG | OXYGEN SATURATION: 97 % | HEIGHT: 77 IN | SYSTOLIC BLOOD PRESSURE: 131 MMHG | BODY MASS INDEX: 33.06 KG/M2 | WEIGHT: 280 LBS | RESPIRATION RATE: 20 BRPM

## 2022-03-01 DIAGNOSIS — I71.20 THORACIC AORTIC ANEURYSM WITHOUT RUPTURE, UNSPECIFIED PART: ICD-10-CM

## 2022-03-01 DIAGNOSIS — I38 VALVULAR HEART DISEASE: ICD-10-CM

## 2022-03-01 DIAGNOSIS — E03.9 ACQUIRED HYPOTHYROIDISM: ICD-10-CM

## 2022-03-01 DIAGNOSIS — Z86.79 STATUS POST ASCENDING AORTIC ANEURYSM REPAIR: Primary | ICD-10-CM

## 2022-03-01 DIAGNOSIS — Z95.2 S/P AVR (AORTIC VALVE REPLACEMENT): ICD-10-CM

## 2022-03-01 DIAGNOSIS — Z98.890 STATUS POST ASCENDING AORTIC ANEURYSM REPAIR: Primary | ICD-10-CM

## 2022-03-01 DIAGNOSIS — E78.49 OTHER HYPERLIPIDEMIA: ICD-10-CM

## 2022-03-01 DIAGNOSIS — R03.0 PRE-HYPERTENSION: ICD-10-CM

## 2022-03-01 DIAGNOSIS — G47.33 OBSTRUCTIVE SLEEP APNEA SYNDROME: ICD-10-CM

## 2022-03-01 PROCEDURE — 99213 OFFICE O/P EST LOW 20 MIN: CPT | Performed by: THORACIC SURGERY (CARDIOTHORACIC VASCULAR SURGERY)

## 2022-03-06 NOTE — PROGRESS NOTES
3/6/2022    Seen on 3/1/2022    Chief Complaint:     Follow-up thoracic aortic aneurysm, status post resection    History of Present Illness:       Dear Colleagues,  It was nice to see Maged Christopher in follow up evaluation for his proximal thoracic aortic aneurysm. He is a 46 y.o. male with a history of hypertension, hyperlipidemia, and aortic valve replacement and aneurysm repair and root repair in 2014 by me. He had a biologic prosthesis. He denies any new symptoms in the interval. His last chest CT showed mild dilatation of the aortic root at approximately 4.5 to 4.7 cm without dissection or ulceration. The echocardiogram showed no significant aortic valve disease. He has no family history of aneurysms, dissections or connective tissue disorders.    Patient Active Problem List   Diagnosis   • Status post ascending aortic aneurysm repair   • S/P AVR (aortic valve replacement)   • Anxiety about health   • Acquired hypothyroidism   • Environmental and seasonal allergies   • Other hyperlipidemia   • Obstructive sleep apnea syndrome   • Pre-hypertension   • Valvular heart disease   • Localized skin mass, lump, or swelling   • Family history of prostate cancer in father       Past Medical History:   Diagnosis Date   • Aneurysm of ascending aorta (HCC)     S/p AVR   • Aortic regurgitation    • Aortic valve insufficiency    • Bicuspid aortic valve    • Deviated nasal septum    • Hypothyroidism    • Lung collapse    • Nasal polyp 01/2018   • Palpitations    • Valvular heart disease        Past Surgical History:   Procedure Laterality Date   • AORTIC VALVE REPAIR/REPLACEMENT     • OTHER SURGICAL HISTORY      ASC Aortic Aneurysm Graft; Coronary Reconstruction with Root Repalcement   • THORACOSCOPY      (Therapeutic) with Pleurodesis       No Known Allergies      Current Outpatient Medications:   •  atorvastatin (LIPITOR) 10 MG tablet, Take 1 tablet by mouth Every Night., Disp: 90 tablet, Rfl: 3  •  cetirizine (ZyrTEC) 10  MG tablet, Take 10 mg by mouth daily., Disp: , Rfl:   •  cholecalciferol (VITAMIN D3) 1000 UNITS tablet, Take 1,000 Units by mouth Daily., Disp: , Rfl:   •  levothyroxine (Synthroid) 88 MCG tablet, Take 1 tablet by mouth Daily., Disp: 90 tablet, Rfl: 3  •  metoprolol tartrate (LOPRESSOR) 25 MG tablet, Take 1 tablet by mouth 2 (Two) Times a Day., Disp: 180 tablet, Rfl: 3  •  mometasone (ELOCON) 0.1 % cream, , Disp: , Rfl:   •  montelukast (SINGULAIR) 10 MG tablet, Take 1 tablet by mouth Daily., Disp: 90 tablet, Rfl: 3  •  Multiple Vitamin (MULTIVITAMIN) capsule, Take 1 capsule by mouth daily., Disp: , Rfl:     Social History     Socioeconomic History   • Marital status:    Tobacco Use   • Smoking status: Former Smoker     Types: Cigarettes   • Smokeless tobacco: Never Used   Substance and Sexual Activity   • Alcohol use: Yes     Comment: Rare; Daily Caffine Use   • Drug use: No   • Sexual activity: Defer       Family History   Problem Relation Age of Onset   • Hypertension Paternal Grandfather    • Valvular heart disease Other      Review of Systems   Constitutional: Negative for fatigue.   Respiratory: Negative for shortness of breath.    Cardiovascular: Negative for chest pain, palpitations and leg swelling.   Neurological: Negative for weakness.   All other systems reviewed and are negative.      Physical Exam:    Vital Signs:  Weight: 127 kg (280 lb)   Body mass index is 33.2 kg/m².  Temp: 98 °F (36.7 °C)   Heart Rate: 56   BP: 131/87     Constitutional:       Appearance: Well-developed.   Eyes:      Conjunctiva/sclera: Conjunctivae normal.      Pupils: Pupils are equal, round, and reactive to light.   HENT:      Head: Normocephalic and atraumatic.      Nose: Nose normal.   Neck:      Thyroid: No thyromegaly.      Vascular: No JVD.      Lymphadenopathy: No cervical adenopathy.   Pulmonary:      Effort: Pulmonary effort is normal.      Breath sounds: Normal breath sounds. No rales.   Cardiovascular:       Normal rate. Regular rhythm.      Murmurs: There is no murmur.      No gallop. No click. No rub.   Pulses:     Intact distal pulses. No decreased pulses.   Edema:     Peripheral edema absent.   Abdominal:      General: Bowel sounds are normal. There is no distension.      Palpations: Abdomen is soft. There is no abdominal mass.      Tenderness: There is no abdominal tenderness.   Musculoskeletal: Normal range of motion.         General: No tenderness or deformity.      Cervical back: Normal range of motion and neck supple. Skin:     General: Skin is warm and dry.      Findings: No erythema or rash.   Neurological:      Mental Status: Alert and oriented to person, place, and time.      Deep Tendon Reflexes: Reflexes are normal and symmetric.   Psychiatric:         Behavior: Behavior normal.     Sternal incision is well-healed     Assessment:     Problems Addressed this Visit        Cardiac and Vasculature    Status post ascending aortic aneurysm repair - Primary    S/P AVR (aortic valve replacement)    Other hyperlipidemia    Pre-hypertension       Endocrine and Metabolic    Acquired hypothyroidism       Sleep    Obstructive sleep apnea syndrome      Diagnoses       Codes Comments    Status post ascending aortic aneurysm repair    -  Primary ICD-10-CM: Z98.890, Z86.79  ICD-9-CM: V45.89     S/P AVR (aortic valve replacement)     ICD-10-CM: Z95.2  ICD-9-CM: V43.3     Other hyperlipidemia     ICD-10-CM: E78.49  ICD-9-CM: 272.4     Pre-hypertension     ICD-10-CM: R03.0  ICD-9-CM: 796.2     Acquired hypothyroidism     ICD-10-CM: E03.9  ICD-9-CM: 244.9     Obstructive sleep apnea syndrome     ICD-10-CM: G47.33  ICD-9-CM: 327.23                 Assessment/recommendation:     He is status post aortic valve replacement and ascending aortic repair and root aortoplasty. He has progressive dilatation of the aortic root arrested with a diameter 4.5/4.7 cm. There is no dissection in the area. The aortic valve seems to be  functional without valve deterioration. I discussed the patient until the aortic root becomes to a diameter of 5.5 cm or the aortic valve deteriorates, there is no reason for intervention. Explained the patient importance of blood pressure control to decrease the DP DT and prevent aneurysmal expansion. I will see him in the office in 1 year with a chest CT and echocardiogram.    Thank you for allowing me to participate in his care.    Regards,    Lupillo Black M.D.  I spent approximately 25 minutes in reviewing the records, examining the patient, reviewing and interpreting the chest CT and echocardiogram myself and discussing the findings and options with the patient, the plan of longitudinal follow-up and documenting in the electronic record

## 2022-12-08 ENCOUNTER — OFFICE VISIT (OUTPATIENT)
Dept: CARDIOLOGY | Facility: CLINIC | Age: 47
End: 2022-12-08

## 2022-12-08 VITALS
HEIGHT: 77 IN | DIASTOLIC BLOOD PRESSURE: 80 MMHG | WEIGHT: 285 LBS | BODY MASS INDEX: 33.65 KG/M2 | SYSTOLIC BLOOD PRESSURE: 130 MMHG | HEART RATE: 60 BPM

## 2022-12-08 DIAGNOSIS — Z98.890 STATUS POST ASCENDING AORTIC ANEURYSM REPAIR: ICD-10-CM

## 2022-12-08 DIAGNOSIS — Z95.2 S/P AVR (AORTIC VALVE REPLACEMENT): Primary | ICD-10-CM

## 2022-12-08 DIAGNOSIS — Z86.79 STATUS POST ASCENDING AORTIC ANEURYSM REPAIR: ICD-10-CM

## 2022-12-08 PROCEDURE — 93000 ELECTROCARDIOGRAM COMPLETE: CPT | Performed by: INTERNAL MEDICINE

## 2022-12-08 PROCEDURE — 99213 OFFICE O/P EST LOW 20 MIN: CPT | Performed by: INTERNAL MEDICINE

## 2022-12-08 NOTE — PROGRESS NOTES
"      CARDIOLOGY    Fidel Norton MD    ENCOUNTER DATE:  12/08/2022    Maged Christopher / 47 y.o. / male        CHIEF COMPLAINT / REASON FOR OFFICE VISIT     S/P AVR (aortic valve replacement) (12/02/2021 Follow up)      HISTORY OF PRESENT ILLNESS       HPI  Maged Christopher is a 47 y.o. male who presents today for reevaluation.  Patient has a history of aortic valve replacement as well as repair of his ascending aorta.  He had a CT scan done about a year ago.  His root remained stable.  His last echo was about 2 years ago and again was functioning normally.  Patient denies any types of current cardiac symptoms and says he feels good.  He is moving to [place].      The following portions of the patient's history were reviewed and updated as appropriate: allergies, current medications, past family history, past medical history, past social history, past surgical history and problem list.      VITAL SIGNS     Visit Vitals  /80 (BP Location: Left arm)   Pulse 60   Ht 195.6 cm (77\")   Wt 129 kg (285 lb)   BMI 33.80 kg/m²         Wt Readings from Last 3 Encounters:   12/08/22 129 kg (285 lb)   03/01/22 127 kg (280 lb)   02/17/22 127 kg (281 lb)     Body mass index is 33.8 kg/m².      REVIEW OF SYSTEMS   ROS        PHYSICAL EXAMINATION     Vitals reviewed.   Constitutional:       Appearance: Healthy appearance.   Pulmonary:      Effort: Pulmonary effort is normal.   Cardiovascular:      Normal rate. Regular rhythm. Normal S1. Normal S2.      Murmurs: There is no murmur.      No gallop. No click. No rub.   Pulses:     Intact distal pulses.   Edema:     Peripheral edema absent.   Neurological:      Mental Status: Alert and oriented to person, place and time.           REVIEWED DATA       ECG 12 Lead    Date/Time: 12/8/2022 11:19 AM  Performed by: Fidel Norton MD  Authorized by: Fidel Norton MD   Comparison: compared with previous ECG from 12/2/2021  Similar to previous ECG  Rhythm: sinus " rhythm    Clinical impression: normal ECG            Cardiac Procedures:  1.     Lipid Panel    Lipid Panel 2/11/22 8/31/22   Total Cholesterol 154 120   Triglycerides 171 (A) 135   HDL Cholesterol 37 (A) 29 (A)   VLDL Cholesterol 30 24   LDL Cholesterol  87 67   LDL/HDL Ratio 2.4 2.21   (A) Abnormal value       Comments are available for some flowsheets but are not being displayed.               ASSESSMENT & PLAN      Diagnosis Plan   1. S/P AVR (aortic valve replacement)              SUMMARY/DISCUSSION  1. Status post aortic valve.  Patient's last echo was 2 years ago clinically it sounds good would repeat his echo next year.  2. Aortic root repair.  CT scan done on 1/26/2022 showed the distal aortic graft caliber was within normal limits.  There is unchanged and stable.  3. Patient is to follow-up in [place] there is anything we can provide a total contact her office.  He does have my chart so his records will be available for his new cardiologist.        MEDICATIONS         Discharge Medications          Accurate as of December 8, 2022 11:17 AM. If you have any questions, ask your nurse or doctor.            Continue These Medications      Instructions Start Date   atorvastatin 10 MG tablet  Commonly known as: LIPITOR   10 mg, Oral, Nightly      cetirizine 10 MG tablet  Commonly known as: zyrTEC   10 mg, Oral, Daily      cholecalciferol 25 MCG (1000 UT) tablet  Commonly known as: VITAMIN D3   1,000 Units, Oral, Daily      levothyroxine 88 MCG tablet  Commonly known as: Synthroid   88 mcg, Oral, Daily      metoprolol tartrate 25 MG tablet  Commonly known as: LOPRESSOR   25 mg, Oral, 2 Times Daily      mometasone 0.1 % cream  Commonly known as: ELOCON   No dose, route, or frequency recorded.      montelukast 10 MG tablet  Commonly known as: SINGULAIR   10 mg, Oral, Daily      multivitamin capsule   1 capsule, Oral, Daily                 **Dragon Disclaimer:   Much of this encounter note is an electronic  transcription/translation of spoken language to printed text. The electronic translation of spoken language may permit erroneous, or at times, nonsensical words or phrases to be inadvertently transcribed. Although I have reviewed the note for such errors, some may still exist.

## 2023-01-20 RX ORDER — ATORVASTATIN CALCIUM 10 MG/1
TABLET, FILM COATED ORAL
Qty: 90 TABLET | Refills: 3 | Status: SHIPPED | OUTPATIENT
Start: 2023-01-20

## 2023-01-20 RX ORDER — LEVOTHYROXINE SODIUM 88 UG/1
TABLET ORAL
Qty: 90 TABLET | Refills: 3 | Status: SHIPPED | OUTPATIENT
Start: 2023-01-20

## 2023-01-20 RX ORDER — MONTELUKAST SODIUM 10 MG/1
TABLET ORAL
Qty: 90 TABLET | Refills: 3 | Status: SHIPPED | OUTPATIENT
Start: 2023-01-20